# Patient Record
Sex: FEMALE | Race: WHITE | Employment: OTHER | URBAN - METROPOLITAN AREA
[De-identification: names, ages, dates, MRNs, and addresses within clinical notes are randomized per-mention and may not be internally consistent; named-entity substitution may affect disease eponyms.]

---

## 2017-01-30 ENCOUNTER — OFFICE VISIT (OUTPATIENT)
Dept: INTERNAL MEDICINE CLINIC | Age: 57
End: 2017-01-30

## 2017-01-30 VITALS
BODY MASS INDEX: 35.3 KG/M2 | RESPIRATION RATE: 17 BRPM | HEIGHT: 61 IN | HEART RATE: 68 BPM | DIASTOLIC BLOOD PRESSURE: 78 MMHG | SYSTOLIC BLOOD PRESSURE: 121 MMHG | WEIGHT: 187 LBS | OXYGEN SATURATION: 95 %

## 2017-01-30 DIAGNOSIS — Z00.00 PREVENTATIVE HEALTH CARE: ICD-10-CM

## 2017-01-30 DIAGNOSIS — E28.2 PCOS (POLYCYSTIC OVARIAN SYNDROME): ICD-10-CM

## 2017-01-30 DIAGNOSIS — I10 ESSENTIAL HYPERTENSION: ICD-10-CM

## 2017-01-30 DIAGNOSIS — E66.09 NON MORBID OBESITY DUE TO EXCESS CALORIES: ICD-10-CM

## 2017-01-30 DIAGNOSIS — E03.4 HYPOTHYROIDISM DUE TO ACQUIRED ATROPHY OF THYROID: Primary | ICD-10-CM

## 2017-01-30 LAB — HEPATITIS C ANTIBODY INTERPRETATION: NORMAL

## 2017-01-30 PROCEDURE — 99213 OFFICE O/P EST LOW 20 MIN: CPT | Performed by: INTERNAL MEDICINE

## 2017-01-30 RX ORDER — LEVOTHYROXINE SODIUM 0.05 MG/1
TABLET ORAL
Qty: 30 TABLET | Refills: 3 | Status: SHIPPED | OUTPATIENT
Start: 2017-01-30 | End: 2017-06-08 | Stop reason: SDUPTHER

## 2017-01-30 RX ORDER — SPIRONOLACTONE 100 MG/1
100 TABLET, FILM COATED ORAL DAILY
Qty: 30 TABLET | Refills: 3 | Status: SHIPPED | OUTPATIENT
Start: 2017-01-30 | End: 2017-06-08 | Stop reason: SDUPTHER

## 2017-01-30 RX ORDER — LEVOTHYROXINE SODIUM 0.07 MG/1
TABLET ORAL
Qty: 30 TABLET | Refills: 2 | Status: SHIPPED | OUTPATIENT
Start: 2017-01-30 | End: 2017-06-08 | Stop reason: SDUPTHER

## 2017-01-30 RX ORDER — PHENTERMINE HYDROCHLORIDE 37.5 MG/1
37.5 TABLET ORAL
Qty: 30 TABLET | Refills: 0 | Status: SHIPPED | OUTPATIENT
Start: 2017-01-30 | End: 2017-02-27 | Stop reason: SDUPTHER

## 2017-01-30 RX ORDER — LORATADINE 10 MG/1
10 TABLET ORAL DAILY PRN
Qty: 30 TABLET | Refills: 5 | Status: SHIPPED | OUTPATIENT
Start: 2017-01-30 | End: 2017-06-08 | Stop reason: SDUPTHER

## 2017-01-30 ASSESSMENT — PATIENT HEALTH QUESTIONNAIRE - PHQ9
2. FEELING DOWN, DEPRESSED OR HOPELESS: 0
SUM OF ALL RESPONSES TO PHQ9 QUESTIONS 1 & 2: 0
1. LITTLE INTEREST OR PLEASURE IN DOING THINGS: 0
SUM OF ALL RESPONSES TO PHQ QUESTIONS 1-9: 0

## 2017-02-27 ENCOUNTER — OFFICE VISIT (OUTPATIENT)
Dept: INTERNAL MEDICINE CLINIC | Age: 57
End: 2017-02-27

## 2017-02-27 VITALS
RESPIRATION RATE: 17 BRPM | BODY MASS INDEX: 34.58 KG/M2 | HEART RATE: 67 BPM | WEIGHT: 183 LBS | SYSTOLIC BLOOD PRESSURE: 119 MMHG | OXYGEN SATURATION: 98 % | DIASTOLIC BLOOD PRESSURE: 78 MMHG

## 2017-02-27 DIAGNOSIS — I10 ESSENTIAL HYPERTENSION: ICD-10-CM

## 2017-02-27 DIAGNOSIS — E66.09 NON MORBID OBESITY DUE TO EXCESS CALORIES: Primary | ICD-10-CM

## 2017-02-27 PROCEDURE — 99213 OFFICE O/P EST LOW 20 MIN: CPT | Performed by: INTERNAL MEDICINE

## 2017-02-27 RX ORDER — PHENTERMINE HYDROCHLORIDE 37.5 MG/1
37.5 TABLET ORAL
Qty: 30 TABLET | Refills: 0 | Status: SHIPPED | OUTPATIENT
Start: 2017-02-27 | End: 2017-03-24 | Stop reason: SDUPTHER

## 2017-02-27 ASSESSMENT — PATIENT HEALTH QUESTIONNAIRE - PHQ9
2. FEELING DOWN, DEPRESSED OR HOPELESS: 0
SUM OF ALL RESPONSES TO PHQ QUESTIONS 1-9: 0
SUM OF ALL RESPONSES TO PHQ9 QUESTIONS 1 & 2: 0
1. LITTLE INTEREST OR PLEASURE IN DOING THINGS: 0

## 2017-03-24 ENCOUNTER — OFFICE VISIT (OUTPATIENT)
Dept: INTERNAL MEDICINE CLINIC | Age: 57
End: 2017-03-24

## 2017-03-24 VITALS
SYSTOLIC BLOOD PRESSURE: 110 MMHG | DIASTOLIC BLOOD PRESSURE: 68 MMHG | BODY MASS INDEX: 34.39 KG/M2 | HEART RATE: 76 BPM | RESPIRATION RATE: 18 BRPM | WEIGHT: 182 LBS

## 2017-03-24 DIAGNOSIS — E03.4 HYPOTHYROIDISM DUE TO ACQUIRED ATROPHY OF THYROID: ICD-10-CM

## 2017-03-24 DIAGNOSIS — E66.09 NON MORBID OBESITY DUE TO EXCESS CALORIES: Primary | ICD-10-CM

## 2017-03-24 DIAGNOSIS — I10 ESSENTIAL HYPERTENSION: ICD-10-CM

## 2017-03-24 PROCEDURE — 99213 OFFICE O/P EST LOW 20 MIN: CPT | Performed by: INTERNAL MEDICINE

## 2017-03-24 RX ORDER — PHENTERMINE HYDROCHLORIDE 37.5 MG/1
37.5 TABLET ORAL
Qty: 30 TABLET | Refills: 0 | Status: SHIPPED | OUTPATIENT
Start: 2017-03-24 | End: 2017-04-23

## 2017-06-08 ENCOUNTER — OFFICE VISIT (OUTPATIENT)
Dept: INTERNAL MEDICINE CLINIC | Age: 57
End: 2017-06-08

## 2017-06-08 VITALS
DIASTOLIC BLOOD PRESSURE: 80 MMHG | WEIGHT: 186 LBS | HEIGHT: 62 IN | HEART RATE: 67 BPM | RESPIRATION RATE: 16 BRPM | OXYGEN SATURATION: 97 % | BODY MASS INDEX: 34.23 KG/M2 | SYSTOLIC BLOOD PRESSURE: 122 MMHG

## 2017-06-08 DIAGNOSIS — E03.4 HYPOTHYROIDISM DUE TO ACQUIRED ATROPHY OF THYROID: ICD-10-CM

## 2017-06-08 DIAGNOSIS — E78.2 HYPERLIPIDEMIA, MIXED: ICD-10-CM

## 2017-06-08 DIAGNOSIS — I10 ESSENTIAL HYPERTENSION: Primary | ICD-10-CM

## 2017-06-08 DIAGNOSIS — E28.2 PCOS (POLYCYSTIC OVARIAN SYNDROME): ICD-10-CM

## 2017-06-08 PROCEDURE — 99213 OFFICE O/P EST LOW 20 MIN: CPT | Performed by: INTERNAL MEDICINE

## 2017-06-08 RX ORDER — LEVOTHYROXINE SODIUM 0.05 MG/1
TABLET ORAL
Qty: 30 TABLET | Refills: 3 | Status: SHIPPED | OUTPATIENT
Start: 2017-06-08 | End: 2018-06-18

## 2017-06-08 RX ORDER — FLUTICASONE PROPIONATE 50 MCG
2 SPRAY, SUSPENSION (ML) NASAL DAILY
Qty: 1 BOTTLE | Refills: 5 | Status: SHIPPED | OUTPATIENT
Start: 2017-06-08 | End: 2018-06-18 | Stop reason: SDUPTHER

## 2017-06-08 RX ORDER — LORATADINE 10 MG/1
10 TABLET ORAL DAILY PRN
Qty: 30 TABLET | Refills: 5 | Status: SHIPPED | OUTPATIENT
Start: 2017-06-08 | End: 2017-12-08 | Stop reason: SDUPTHER

## 2017-06-08 RX ORDER — HYDRALAZINE HYDROCHLORIDE 25 MG/1
25 TABLET, FILM COATED ORAL 2 TIMES DAILY
Qty: 60 TABLET | Refills: 5 | Status: SHIPPED | OUTPATIENT
Start: 2017-06-08 | End: 2017-12-08 | Stop reason: SDUPTHER

## 2017-06-08 RX ORDER — LEVOTHYROXINE SODIUM 0.07 MG/1
TABLET ORAL
Qty: 30 TABLET | Refills: 2 | Status: SHIPPED | OUTPATIENT
Start: 2017-06-08 | End: 2017-12-08 | Stop reason: SDUPTHER

## 2017-06-08 RX ORDER — SPIRONOLACTONE 100 MG/1
100 TABLET, FILM COATED ORAL DAILY
Qty: 30 TABLET | Refills: 3 | Status: SHIPPED | OUTPATIENT
Start: 2017-06-08 | End: 2017-11-01 | Stop reason: SDUPTHER

## 2017-11-01 DIAGNOSIS — E28.2 PCOS (POLYCYSTIC OVARIAN SYNDROME): ICD-10-CM

## 2017-11-01 RX ORDER — SPIRONOLACTONE 100 MG/1
100 TABLET, FILM COATED ORAL DAILY
Qty: 30 TABLET | Refills: 0 | Status: SHIPPED | OUTPATIENT
Start: 2017-11-01 | End: 2017-12-08 | Stop reason: SDUPTHER

## 2017-11-27 LAB
A/G RATIO: 1.9 (CALC) (ref 0.8–2.6)
ALBUMIN SERPL-MCNC: 4.7 GM/DL (ref 3.5–5.2)
ALP BLD-CCNC: 120 U/L (ref 23–144)
ALT SERPL-CCNC: 29 U/L (ref 0–60)
AST SERPL-CCNC: 29 U/L (ref 0–46)
BILIRUB SERPL-MCNC: 0.4 MG/DL (ref 0–1.2)
BUN / CREAT RATIO: 20 (CALC) (ref 7–25)
BUN BLDV-MCNC: 14 MG/DL (ref 3–29)
CALCIUM SERPL-MCNC: 9.4 MG/DL (ref 8.5–10.5)
CHLORIDE BLD-SCNC: 102 MEQ/L (ref 96–110)
CHOLESTEROL, TOTAL: 223 MG/DL
CO2: 27 MEQ/L (ref 19–32)
COPY(IES) SENT TO:: NORMAL
CREAT SERPL-MCNC: 0.7 MG/DL
GFR SERPL CREATININE-BSD FRML MDRD: 95 ML/MIN/1.73M2
GLOBULIN: 2.5 GM/DL (CALC) (ref 1.9–3.6)
GLUCOSE BLD-MCNC: 99 MG/DL
HDLC SERPL-MCNC: 81 MG/DL
LDL CHOLESTEROL: 112 MG/DL (CALC)
POTASSIUM SERPL-SCNC: 4.4 MEQ/L (ref 3.4–5.3)
SODIUM BLD-SCNC: 141 MEQ/L (ref 135–148)
T4 FREE: 1.65 NG/DL (ref 0.8–1.8)
TOTAL PROTEIN: 7.2 GM/DL (ref 6–8.3)
TRIGL SERPL-MCNC: 150 MG/DL
TSH SERPL DL<=0.05 MIU/L-ACNC: 2.97 MICRO IU/ML (ref 0.4–4)
VLDLC SERPL CALC-MCNC: 30 MG/DL (CALC) (ref 4–38)

## 2017-12-08 ENCOUNTER — OFFICE VISIT (OUTPATIENT)
Dept: INTERNAL MEDICINE CLINIC | Age: 57
End: 2017-12-08

## 2017-12-08 VITALS
BODY MASS INDEX: 35.3 KG/M2 | WEIGHT: 193 LBS | HEART RATE: 78 BPM | SYSTOLIC BLOOD PRESSURE: 136 MMHG | RESPIRATION RATE: 17 BRPM | DIASTOLIC BLOOD PRESSURE: 70 MMHG | OXYGEN SATURATION: 96 %

## 2017-12-08 DIAGNOSIS — E28.2 PCOS (POLYCYSTIC OVARIAN SYNDROME): ICD-10-CM

## 2017-12-08 DIAGNOSIS — E78.2 MIXED HYPERLIPIDEMIA: ICD-10-CM

## 2017-12-08 DIAGNOSIS — E03.4 HYPOTHYROIDISM DUE TO ACQUIRED ATROPHY OF THYROID: Primary | ICD-10-CM

## 2017-12-08 DIAGNOSIS — I10 ESSENTIAL HYPERTENSION: ICD-10-CM

## 2017-12-08 PROCEDURE — 99213 OFFICE O/P EST LOW 20 MIN: CPT | Performed by: INTERNAL MEDICINE

## 2017-12-08 RX ORDER — HYDRALAZINE HYDROCHLORIDE 25 MG/1
25 TABLET, FILM COATED ORAL 2 TIMES DAILY
Qty: 60 TABLET | Refills: 5 | Status: SHIPPED | OUTPATIENT
Start: 2017-12-08 | End: 2018-06-18 | Stop reason: SDUPTHER

## 2017-12-08 RX ORDER — SPIRONOLACTONE 100 MG/1
100 TABLET, FILM COATED ORAL DAILY
Qty: 30 TABLET | Refills: 5 | Status: SHIPPED | OUTPATIENT
Start: 2017-12-08 | End: 2018-06-18 | Stop reason: SDUPTHER

## 2017-12-08 RX ORDER — LORATADINE 10 MG/1
10 TABLET ORAL DAILY PRN
Qty: 30 TABLET | Refills: 5 | Status: SHIPPED | OUTPATIENT
Start: 2017-12-08

## 2017-12-08 RX ORDER — LEVOTHYROXINE SODIUM 0.05 MG/1
TABLET ORAL
Qty: 30 TABLET | Refills: 3 | Status: CANCELLED | OUTPATIENT
Start: 2017-12-08

## 2017-12-08 RX ORDER — LEVOTHYROXINE SODIUM 0.07 MG/1
75 TABLET ORAL DAILY
Qty: 30 TABLET | Refills: 5 | Status: SHIPPED | OUTPATIENT
Start: 2017-12-08 | End: 2018-06-18 | Stop reason: SDUPTHER

## 2017-12-08 NOTE — PROGRESS NOTES
David Rios  1960 12/08/17    SUBJECTIVE:  Hypothyroid- some fatigue noted and incr hair loss. Recent TSH higher than Jan, fr 1.5--2.9. Hypertension: Stable. Denies CP, SOB, cough, visual changes, dizziness, palpitations or HA. PCOS- trying otc inositol. -- ovasitol which is a Bvit supplement. Has been losing some wt on this. OBJECTIVE:    /70   Pulse 78   Resp 17   Wt 193 lb (87.5 kg)   SpO2 96%   BMI 35.30 kg/m²     Physical Exam   Constitutional: She appears well-developed and well-nourished. Neck: Neck supple. No thyromegaly present. Cardiovascular: Normal rate, regular rhythm and normal heart sounds. Exam reveals no gallop and no friction rub. No murmur heard. Pulmonary/Chest: Effort normal and breath sounds normal. No respiratory distress. She has no wheezes. She has no rales. Abdominal: Soft. Bowel sounds are normal. She exhibits no distension. There is no tenderness. Musculoskeletal: She exhibits no edema. Lymphadenopathy:     She has no cervical adenopathy. Neurological: She is alert. Psychiatric: She has a normal mood and affect. Vitals reviewed. ASSESSMENT:    1. Hypothyroidism due to acquired atrophy of thyroid    2. Essential hypertension    3. PCOS (polycystic ovarian syndrome)        PLAN:    Velia Yeh was seen today for 6 month follow-up. Diagnoses and all orders for this visit:    Hypothyroidism due to acquired atrophy of thyroid- WILL TRY INCR SYNTHR FR ALT 50/75 TO 75MG DAILY THEN F/U TFTS 6WEEKS THEN 6MO  -     levothyroxine (SYNTHROID) 75 MCG tablet; Take 1 tablet by mouth daily  -     loratadine (CLARITIN) 10 MG tablet; Take 1 tablet by mouth daily as needed (allergies)  -     TSH without Reflex  -     T4, Free  -     TSH with Reflex  -     Cancel: T4, Free    Essential hypertension - Blood pressure stable and will continue current regimen. Will plan periodic monitoring of renal function, electrolytes, lipid profile.      -

## 2018-01-23 LAB
COPY(IES) SENT TO:: NORMAL
T4 FREE: 1.53 NG/DL (ref 0.8–1.8)
TSH SERPL DL<=0.05 MIU/L-ACNC: 1.6 MICRO IU/ML (ref 0.4–4)

## 2018-06-18 ENCOUNTER — OFFICE VISIT (OUTPATIENT)
Dept: INTERNAL MEDICINE CLINIC | Age: 58
End: 2018-06-18

## 2018-06-18 VITALS
SYSTOLIC BLOOD PRESSURE: 128 MMHG | WEIGHT: 198.6 LBS | BODY MASS INDEX: 36.55 KG/M2 | HEIGHT: 62 IN | OXYGEN SATURATION: 97 % | RESPIRATION RATE: 16 BRPM | HEART RATE: 51 BPM | DIASTOLIC BLOOD PRESSURE: 78 MMHG

## 2018-06-18 DIAGNOSIS — Z00.00 ROUTINE GENERAL MEDICAL EXAMINATION AT A HEALTH CARE FACILITY: Primary | ICD-10-CM

## 2018-06-18 DIAGNOSIS — E28.2 PCOS (POLYCYSTIC OVARIAN SYNDROME): ICD-10-CM

## 2018-06-18 DIAGNOSIS — E03.4 HYPOTHYROIDISM DUE TO ACQUIRED ATROPHY OF THYROID: ICD-10-CM

## 2018-06-18 DIAGNOSIS — I10 ESSENTIAL HYPERTENSION: ICD-10-CM

## 2018-06-18 DIAGNOSIS — E66.09 NON MORBID OBESITY DUE TO EXCESS CALORIES: ICD-10-CM

## 2018-06-18 DIAGNOSIS — E78.2 MIXED HYPERLIPIDEMIA: ICD-10-CM

## 2018-06-18 PROCEDURE — 99396 PREV VISIT EST AGE 40-64: CPT | Performed by: INTERNAL MEDICINE

## 2018-06-18 RX ORDER — LEVOTHYROXINE SODIUM 0.07 MG/1
75 TABLET ORAL DAILY
Qty: 30 TABLET | Refills: 5 | Status: SHIPPED | OUTPATIENT
Start: 2018-06-18 | End: 2018-12-26 | Stop reason: SDUPTHER

## 2018-06-18 RX ORDER — FLUTICASONE PROPIONATE 50 MCG
2 SPRAY, SUSPENSION (ML) NASAL DAILY
Qty: 1 BOTTLE | Refills: 5 | Status: SHIPPED | OUTPATIENT
Start: 2018-06-18 | End: 2018-12-26 | Stop reason: SDUPTHER

## 2018-06-18 RX ORDER — HYDRALAZINE HYDROCHLORIDE 25 MG/1
25 TABLET, FILM COATED ORAL 2 TIMES DAILY
Qty: 60 TABLET | Refills: 5 | Status: SHIPPED | OUTPATIENT
Start: 2018-06-18 | End: 2018-12-26 | Stop reason: SDUPTHER

## 2018-06-18 RX ORDER — SPIRONOLACTONE 100 MG/1
100 TABLET, FILM COATED ORAL DAILY
Qty: 30 TABLET | Refills: 5 | Status: SHIPPED | OUTPATIENT
Start: 2018-06-18 | End: 2018-12-26 | Stop reason: SDUPTHER

## 2018-06-18 ASSESSMENT — PATIENT HEALTH QUESTIONNAIRE - PHQ9
1. LITTLE INTEREST OR PLEASURE IN DOING THINGS: 0
SUM OF ALL RESPONSES TO PHQ9 QUESTIONS 1 & 2: 0
SUM OF ALL RESPONSES TO PHQ QUESTIONS 1-9: 0
2. FEELING DOWN, DEPRESSED OR HOPELESS: 0

## 2018-08-31 DIAGNOSIS — E28.2 PCOS (POLYCYSTIC OVARIAN SYNDROME): ICD-10-CM

## 2018-12-18 DIAGNOSIS — E03.4 HYPOTHYROIDISM DUE TO ACQUIRED ATROPHY OF THYROID: ICD-10-CM

## 2018-12-18 DIAGNOSIS — I10 ESSENTIAL HYPERTENSION: ICD-10-CM

## 2018-12-18 DIAGNOSIS — E78.2 MIXED HYPERLIPIDEMIA: ICD-10-CM

## 2018-12-18 LAB
ALBUMIN SERPL-MCNC: 4.6 G/DL
ALP BLD-CCNC: 111 U/L
ALT SERPL-CCNC: 30 U/L
ANION GAP SERPL CALCULATED.3IONS-SCNC: 1.9 MMOL/L
AST SERPL-CCNC: 17 U/L
BASOPHILS ABSOLUTE: 0.1 /ΜL
BASOPHILS RELATIVE PERCENT: 1.2 %
BILIRUB SERPL-MCNC: 0.3 MG/DL (ref 0.1–1.4)
BUN BLDV-MCNC: 16 MG/DL
CALCIUM SERPL-MCNC: 9.7 MG/DL
CHLORIDE BLD-SCNC: 103 MMOL/L
CHOLESTEROL, TOTAL: 201 MG/DL
CHOLESTEROL/HDL RATIO: ABNORMAL
CO2: 26 MMOL/L
CREAT SERPL-MCNC: 0.7 MG/DL
EOSINOPHILS ABSOLUTE: 0.3 /ΜL
EOSINOPHILS RELATIVE PERCENT: 3.8 %
GFR CALCULATED: 94
GLUCOSE BLD-MCNC: 102 MG/DL
HCT VFR BLD CALC: 45.1 % (ref 36–46)
HDLC SERPL-MCNC: 76 MG/DL (ref 35–70)
HEMOGLOBIN: 14.7 G/DL (ref 12–16)
LDL CHOLESTEROL CALCULATED: 106 MG/DL (ref 0–160)
LYMPHOCYTES ABSOLUTE: 2.5 /ΜL
LYMPHOCYTES RELATIVE PERCENT: 29.5 %
MCH RBC QN AUTO: 30.2 PG
MCHC RBC AUTO-ENTMCNC: 32.5 G/DL
MCV RBC AUTO: 92.7 FL
MONOCYTES ABSOLUTE: 0.6 /ΜL
MONOCYTES RELATIVE PERCENT: 7.3 %
NEUTROPHILS ABSOLUTE: 5 /ΜL
NEUTROPHILS RELATIVE PERCENT: 58.2 %
PDW BLD-RTO: 13.6 %
PLATELET # BLD: 329 K/ΜL
PMV BLD AUTO: NORMAL FL
POTASSIUM SERPL-SCNC: 4.1 MMOL/L
RBC # BLD: 4.87 10^6/ΜL
SODIUM BLD-SCNC: 143 MMOL/L
T4 FREE: 1.75
TOTAL PROTEIN: 7
TRIGL SERPL-MCNC: 93 MG/DL
TSH SERPL DL<=0.05 MIU/L-ACNC: 1.91 UIU/ML
VLDLC SERPL CALC-MCNC: 19 MG/DL
WBC # BLD: 8.6 10^3/ML

## 2018-12-26 ENCOUNTER — OFFICE VISIT (OUTPATIENT)
Dept: INTERNAL MEDICINE CLINIC | Age: 58
End: 2018-12-26
Payer: COMMERCIAL

## 2018-12-26 VITALS
BODY MASS INDEX: 37.76 KG/M2 | HEART RATE: 68 BPM | OXYGEN SATURATION: 94 % | RESPIRATION RATE: 18 BRPM | WEIGHT: 200 LBS | HEIGHT: 61 IN | SYSTOLIC BLOOD PRESSURE: 124 MMHG | DIASTOLIC BLOOD PRESSURE: 72 MMHG

## 2018-12-26 DIAGNOSIS — E03.4 HYPOTHYROIDISM DUE TO ACQUIRED ATROPHY OF THYROID: ICD-10-CM

## 2018-12-26 DIAGNOSIS — E28.2 PCOS (POLYCYSTIC OVARIAN SYNDROME): Primary | ICD-10-CM

## 2018-12-26 DIAGNOSIS — E78.2 MIXED HYPERLIPIDEMIA: ICD-10-CM

## 2018-12-26 DIAGNOSIS — I10 ESSENTIAL HYPERTENSION: ICD-10-CM

## 2018-12-26 DIAGNOSIS — J30.89 NON-SEASONAL ALLERGIC RHINITIS DUE TO OTHER ALLERGIC TRIGGER: ICD-10-CM

## 2018-12-26 PROCEDURE — 99214 OFFICE O/P EST MOD 30 MIN: CPT | Performed by: INTERNAL MEDICINE

## 2018-12-26 RX ORDER — HYDRALAZINE HYDROCHLORIDE 25 MG/1
25 TABLET, FILM COATED ORAL 2 TIMES DAILY
Qty: 180 TABLET | Refills: 1 | Status: SHIPPED | OUTPATIENT
Start: 2018-12-26 | End: 2019-06-25 | Stop reason: SDUPTHER

## 2018-12-26 RX ORDER — LEVOTHYROXINE SODIUM 0.07 MG/1
75 TABLET ORAL DAILY
Qty: 90 TABLET | Refills: 1 | Status: SHIPPED | OUTPATIENT
Start: 2018-12-26 | End: 2019-06-25 | Stop reason: SDUPTHER

## 2018-12-26 RX ORDER — SPIRONOLACTONE 100 MG/1
100 TABLET, FILM COATED ORAL DAILY
Qty: 90 TABLET | Refills: 1 | Status: SHIPPED | OUTPATIENT
Start: 2018-12-26 | End: 2019-06-25 | Stop reason: SDUPTHER

## 2018-12-26 RX ORDER — FLUTICASONE PROPIONATE 50 MCG
2 SPRAY, SUSPENSION (ML) NASAL DAILY
Qty: 3 BOTTLE | Refills: 1 | Status: SHIPPED | OUTPATIENT
Start: 2018-12-26 | End: 2019-12-23 | Stop reason: SDUPTHER

## 2019-06-25 ENCOUNTER — OFFICE VISIT (OUTPATIENT)
Dept: INTERNAL MEDICINE CLINIC | Age: 59
End: 2019-06-25
Payer: COMMERCIAL

## 2019-06-25 VITALS
DIASTOLIC BLOOD PRESSURE: 70 MMHG | HEIGHT: 61 IN | OXYGEN SATURATION: 95 % | HEART RATE: 56 BPM | SYSTOLIC BLOOD PRESSURE: 122 MMHG | BODY MASS INDEX: 36.55 KG/M2 | WEIGHT: 193.6 LBS

## 2019-06-25 DIAGNOSIS — M79.7 FIBROMYALGIA: ICD-10-CM

## 2019-06-25 DIAGNOSIS — G47.33 OSA (OBSTRUCTIVE SLEEP APNEA): ICD-10-CM

## 2019-06-25 DIAGNOSIS — I10 ESSENTIAL HYPERTENSION: ICD-10-CM

## 2019-06-25 DIAGNOSIS — E78.2 MIXED HYPERLIPIDEMIA: ICD-10-CM

## 2019-06-25 DIAGNOSIS — E28.2 PCOS (POLYCYSTIC OVARIAN SYNDROME): ICD-10-CM

## 2019-06-25 DIAGNOSIS — E03.4 HYPOTHYROIDISM DUE TO ACQUIRED ATROPHY OF THYROID: ICD-10-CM

## 2019-06-25 DIAGNOSIS — Z00.00 ROUTINE GENERAL MEDICAL EXAMINATION AT A HEALTH CARE FACILITY: Primary | ICD-10-CM

## 2019-06-25 PROCEDURE — 99396 PREV VISIT EST AGE 40-64: CPT | Performed by: INTERNAL MEDICINE

## 2019-06-25 RX ORDER — LEVOTHYROXINE SODIUM 0.07 MG/1
75 TABLET ORAL DAILY
Qty: 30 TABLET | Refills: 0 | Status: SHIPPED | OUTPATIENT
Start: 2019-06-25 | End: 2019-06-25 | Stop reason: SDUPTHER

## 2019-06-25 RX ORDER — SPIRONOLACTONE 100 MG/1
100 TABLET, FILM COATED ORAL DAILY
Qty: 90 TABLET | Refills: 3 | Status: SHIPPED | OUTPATIENT
Start: 2019-06-25 | End: 2019-12-23 | Stop reason: SDUPTHER

## 2019-06-25 RX ORDER — HYDRALAZINE HYDROCHLORIDE 25 MG/1
25 TABLET, FILM COATED ORAL 2 TIMES DAILY
Qty: 90 TABLET | Refills: 3 | Status: SHIPPED | OUTPATIENT
Start: 2019-06-25 | End: 2019-07-03 | Stop reason: SDUPTHER

## 2019-06-25 RX ORDER — SPIRONOLACTONE 100 MG/1
100 TABLET, FILM COATED ORAL DAILY
Qty: 30 TABLET | Refills: 0 | Status: SHIPPED | OUTPATIENT
Start: 2019-06-25 | End: 2019-06-25 | Stop reason: SDUPTHER

## 2019-06-25 RX ORDER — HYDRALAZINE HYDROCHLORIDE 25 MG/1
25 TABLET, FILM COATED ORAL 2 TIMES DAILY
Qty: 30 TABLET | Refills: 0 | Status: SHIPPED | OUTPATIENT
Start: 2019-06-25 | End: 2019-06-25 | Stop reason: SDUPTHER

## 2019-06-25 RX ORDER — LEVOTHYROXINE SODIUM 0.07 MG/1
75 TABLET ORAL DAILY
Qty: 90 TABLET | Refills: 3 | Status: SHIPPED | OUTPATIENT
Start: 2019-06-25 | End: 2019-12-23 | Stop reason: SDUPTHER

## 2019-06-25 ASSESSMENT — PATIENT HEALTH QUESTIONNAIRE - PHQ9
2. FEELING DOWN, DEPRESSED OR HOPELESS: 0
SUM OF ALL RESPONSES TO PHQ9 QUESTIONS 1 & 2: 0
1. LITTLE INTEREST OR PLEASURE IN DOING THINGS: 0
SUM OF ALL RESPONSES TO PHQ QUESTIONS 1-9: 0
SUM OF ALL RESPONSES TO PHQ QUESTIONS 1-9: 0

## 2019-06-25 NOTE — PROGRESS NOTES
Internal Medicine  History and Physical        Pratik Mode  YOB: 1960    Date of Service:  6/25/2019      Chief Complaint:   Pratik Joseph is a 62 y.o. 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 Chol also much better and wt is down 7#. Hypertension: Stable. Denies CP, SOB, cough, visual changes, dizziness, palpitations or HA. Hypothyroid has been asymptomatic w/o sx of fatigue, constipation, cold intolerance, depression. Fibromyalgia- also aches better w care home, some L knee ache w strain moving gravel off [place] two mo ago, grad better. PCOS, on metformin. rf due. HARSHA- on cpap, seeing Dr Jayesh Vale periodically.       Patient Active Problem List   Diagnosis    Fibromyalgia    Hypertension    PCOS (polycystic ovarian syndrome)    Family history of colon cancer    Allergic rhinitis    Former smoker    Fatigue    Family history of coronary artery disease    SOBOE (shortness of breath on exertion)    Multiple thyroid nodules    H/O cardiovascular stress test    HARSHA (obstructive sleep apnea)    Mixed hyperlipidemia    GERD (gastroesophageal reflux disease)    Hypothyroidism due to acquired atrophy of thyroid    Non morbid obesity due to excess calories       Preventive Care:  Health Maintenance   Topic Date Due    HIV screen  05/19/1973    DTaP/Tdap/Td vaccine (1 - Tdap) 05/19/1977    Shingles Vaccine (1 of 2) 05/19/2008    Cervical cancer screen  04/11/2015    Breast cancer screen  10/20/2019    Potassium monitoring  06/21/2020    Creatinine monitoring  06/21/2020    Lipid screen  06/21/2024    Colon cancer screen colonoscopy  10/22/2024    Flu vaccine  Completed    Hepatitis C screen  Completed    Pneumococcal 0-64 years Vaccine  Aged Out        Lipid panel:   Lab Results   Component Value Date    TRIG 88 06/20/2019    HDL 59 06/20/2019    181Roby Maza 106 12/17/2018        Immunization History   Administered Date(s) Administered    Influenza Virus Vaccine 09/08/2014, 10/26/2018    Influenza, Intradermal, Preservative free 12/07/2015    Influenza, Tanvi Lanamer, 3 Years and older, IM (Fluzone 3 yrs and older or Afluria 5 yrs and older) 10/31/2016       Allergies   Allergen Reactions    Norvasc [Amlodipine]      Leg swelling     Current Outpatient Medications   Medication Sig Dispense Refill    spironolactone (ALDACTONE) 100 MG tablet Take 1 tablet by mouth daily 90 tablet 1    levothyroxine (SYNTHROID) 75 MCG tablet Take 1 tablet by mouth daily 90 tablet 1    hydrALAZINE (APRESOLINE) 25 MG tablet Take 1 tablet by mouth 2 times daily 180 tablet 1    fluticasone (FLONASE) 50 MCG/ACT nasal spray 2 sprays by Nasal route daily 3 Bottle 1    metFORMIN (GLUCOPHAGE) 500 MG tablet Take 2 tablets by mouth 2 times daily (with meals) 360 tablet 1    loratadine (CLARITIN) 10 MG tablet Take 1 tablet by mouth daily as needed (allergies) 30 tablet 5    aspirin 81 MG tablet Take 81 mg by mouth daily.  Nutritional Supplements (JUICE PLUS FIBRE PO) Take  by mouth 2 times daily.  calcium carbonate (OSCAL) 500 MG TABS tablet Take 500 mg by mouth daily.  ibuprofen (ADVIL) 200 MG CAPS Take 1 capsule by mouth. 2 tablets nightly      Inositol-D Chiro-Inositol (OVASITOL PO) Take 4 g by mouth       No current facility-administered medications for this visit. Past Medical History:   Diagnosis Date    Allergic rhinitis     Basal cell carcinoma 2011; 2014    right side face; left shoulder blade.      Family history of colon cancer     mother dx'd at 46    Family history of coronary artery disease     Fatigue     Fibromyalgia     on zoloft, also for mood    Former smoker     GERD (gastroesophageal reflux disease)     H/O cardiovascular stress test 3/12/2014    treadmill    H/O cardiovascular stress test 3/12/2015    treadmill    Hypertension     Hypothyroidism due to acquired atrophy of thyroid     Multiple thyroid nodules 2015    bx benign, eval 2016 per ENT Dr Amanda Gonzales and for f/u 2 yrs- recheck u/s    HARSHA (obstructive sleep apnea)     Dr Angelito Martin cpap    PCOS (polycystic ovarian syndrome)     on B vit supplement otc    S/P colonoscopy 10/22/14    Dr Susy Chand- recheck 5yrs d/t fhx    SOBOE (shortness of breath on exertion)      Past Surgical History:   Procedure Laterality Date    HYSTERECTOMY      Total    TONSILLECTOMY  1968     Family History   Problem Relation Age of Onset    Kidney Disease Mother         Kidney failure    Heart Failure Mother         CHF    Cancer Mother         colon CA at 46    High Blood Pressure Mother    Nohemi Amble Stroke Mother     Obesity Mother     Depression Mother     Diabetes Father     High Blood Pressure Father     Stroke Father     Drug Abuse Father     Alcohol Abuse Father     Alcohol Abuse Sister     Drug Abuse Sister     Cirrhosis Sister     Other Sister         Hep C    Obesity Sister     Drug Abuse Brother     Alcohol Abuse Brother      Social History     Socioeconomic History    Marital status:      Spouse name: Not on file    Number of children: Not on file    Years of education: Not on file    Highest education level: Not on file   Occupational History    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[place] [address] 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   Social Needs    Financial resource strain: Not on file    Food insecurity:     Worry: Not on file     Inability: Not on file   [employer] needs:     Medical: Not on file     Non-medical: Not on file   Tobacco Use    Smoking status: Former Smoker     Packs/day: 1.00     Years: 25.00     Pack years: 25.00     Last attempt to quit:      Years since quittin.4    Smokeless tobacco: Never Used   Substance and Sexual Activity    Alcohol use: Yes     Comment: Very rare    Drug use: Not on file    Sexual activity: Not on file   Lifestyle    Physical activity:     Days per week: Not on file     Minutes per session: Not on file    Stress: Not on file   Relationships    Social connections:     Talks on phone: Not on file     Gets together: Not on file     Attends Yazidi service: Not on file     Active member of club or organization: Not on file     Attends meetings of clubs or organizations: Not on file     Relationship status: Not on file    Intimate partner violence:     Fear of current or ex partner: Not on file     Emotionally abused: Not on file     Physically abused: Not on file     Forced sexual activity: Not on file   Other Topics Concern    Not on file   Social History Narrative    Not on file       Review of Systems:  A comprehensive review of systems was negative except for what was noted in the HPI. Wt Readings from Last 3 Encounters:   06/25/19 193 lb 9.6 oz (87.8 kg)   12/26/18 200 lb (90.7 kg)   06/18/18 198 lb 9.6 oz (90.1 kg)     BP Readings from Last 3 Encounters:   06/25/19 122/70   12/26/18 124/72   06/18/18 128/78       Physical Exam:   Vitals:    06/25/19 1006   BP: 122/70   Site: Left Upper Arm   Position: Sitting   Cuff Size: Medium Adult   Pulse: 56   SpO2: 95%   Weight: 193 lb 9.6 oz (87.8 kg)   Height: 5' 1\" (1.549 m)     Body mass index is 36.58 kg/m². Constitutional: She is oriented to person, place, and time. She appears well-developed and well-nourished. No distress. HEENT:  Head: Normocephalic and atraumatic. Nose: Nose normal.   Mouth/Throat: Oropharynx is clear and moist, and mucous membranes are normal.  There is no cervical adenopathy. Eyes: Conjunctivae and extraocular motions are normal. Pupils are equal, round, and reactive to light. Neck: Neck supple. No JVD present. No mass and no thyromegaly present. Cardiovascular: Normal rate, regular rhythm, normal heart sounds and intact distal pulses.   Exam reveals no gallop and no friction rub. No murmur heard. Pulmonary/Chest: Effort normal and breath sounds normal. No respiratory distress. She has no wheezes, rhonchi or rales. Abdominal: Soft, non-tender. Bowel sounds and aorta are normal. She exhibits no organomegaly, mass or bruit. Musculoskeletal: Normal range of motion, no synovitis. She exhibits no edema. Neurological: She is alert and oriented to person, place, and time. No cranial nerve deficit. Skin: Skin is warm and dry. There is no rash or erythema. Psychiatric: She has a normal mood and affect. Her speech is normal and behavior is normal. Judgment, cognition and memory are normal.         Assessment/Plan:  Damian Tee was seen today for annual exam and knee pain. Diagnoses and all orders for this visit:    Routine general medical examination at a health care facility- [address] [place] [address], WT DOWN AND BP STABLE, SEE BELOW    Essential hypertension- Blood pressure stable and will continue current regimen. Will plan periodic monitoring of renal function, electrolytes, lipid profile. -     Discontinue: hydrALAZINE (APRESOLINE) 25 MG tablet; Take 1 tablet by mouth 2 times daily  -     Lipid Panel; Future  -     Comprehensive Metabolic Panel; Future  -     CBC Auto Differential; Future  -     hydrALAZINE (APRESOLINE) 25 MG tablet; Take 1 tablet by mouth 2 times daily    PCOS (polycystic ovarian syndrome)- The current medical regimen is effective;  continue present plan and medications. -     Discontinue: metFORMIN (GLUCOPHAGE) 500 MG tablet; Take 2 tablets by mouth 2 times daily (with meals)  -     Discontinue: spironolactone (ALDACTONE) 100 MG tablet; Take 1 tablet by mouth daily  -     metFORMIN (GLUCOPHAGE) 500 MG tablet; Take 2 tablets by mouth 2 times daily (with meals)  -     spironolactone (ALDACTONE) 100 MG tablet;  Take 1 tablet by mouth daily    Hypothyroidism due to acquired atrophy of thyroid - Clinically hypothyroidism

## 2019-07-03 DIAGNOSIS — E28.2 PCOS (POLYCYSTIC OVARIAN SYNDROME): ICD-10-CM

## 2019-07-03 DIAGNOSIS — I10 ESSENTIAL HYPERTENSION: ICD-10-CM

## 2019-07-03 RX ORDER — HYDRALAZINE HYDROCHLORIDE 25 MG/1
25 TABLET, FILM COATED ORAL 2 TIMES DAILY
Qty: 180 TABLET | Refills: 3 | Status: SHIPPED | OUTPATIENT
Start: 2019-07-03 | End: 2019-12-23 | Stop reason: SDUPTHER

## 2019-12-23 ENCOUNTER — OFFICE VISIT (OUTPATIENT)
Dept: INTERNAL MEDICINE CLINIC | Age: 59
End: 2019-12-23
Payer: COMMERCIAL

## 2019-12-23 VITALS
OXYGEN SATURATION: 97 % | WEIGHT: 189 LBS | BODY MASS INDEX: 35.71 KG/M2 | HEART RATE: 73 BPM | SYSTOLIC BLOOD PRESSURE: 130 MMHG | DIASTOLIC BLOOD PRESSURE: 70 MMHG | RESPIRATION RATE: 16 BRPM

## 2019-12-23 DIAGNOSIS — Z12.31 VISIT FOR SCREENING MAMMOGRAM: ICD-10-CM

## 2019-12-23 DIAGNOSIS — F41.9 ANXIETY: ICD-10-CM

## 2019-12-23 DIAGNOSIS — E78.2 MIXED HYPERLIPIDEMIA: ICD-10-CM

## 2019-12-23 DIAGNOSIS — J30.89 NON-SEASONAL ALLERGIC RHINITIS DUE TO OTHER ALLERGIC TRIGGER: ICD-10-CM

## 2019-12-23 DIAGNOSIS — Z90.710 S/P TOTAL HYSTERECTOMY AND BILATERAL SALPINGO-OOPHORECTOMY: ICD-10-CM

## 2019-12-23 DIAGNOSIS — Z80.0 FAMILY HISTORY OF COLON CANCER: ICD-10-CM

## 2019-12-23 DIAGNOSIS — I10 ESSENTIAL HYPERTENSION: Primary | ICD-10-CM

## 2019-12-23 DIAGNOSIS — E28.2 PCOS (POLYCYSTIC OVARIAN SYNDROME): ICD-10-CM

## 2019-12-23 DIAGNOSIS — E03.4 HYPOTHYROIDISM DUE TO ACQUIRED ATROPHY OF THYROID: ICD-10-CM

## 2019-12-23 DIAGNOSIS — Z90.722 S/P TOTAL HYSTERECTOMY AND BILATERAL SALPINGO-OOPHORECTOMY: ICD-10-CM

## 2019-12-23 DIAGNOSIS — Z90.79 S/P TOTAL HYSTERECTOMY AND BILATERAL SALPINGO-OOPHORECTOMY: ICD-10-CM

## 2019-12-23 PROCEDURE — 99214 OFFICE O/P EST MOD 30 MIN: CPT | Performed by: INTERNAL MEDICINE

## 2019-12-23 RX ORDER — BUSPIRONE HYDROCHLORIDE 10 MG/1
10 TABLET ORAL 2 TIMES DAILY PRN
Qty: 90 TABLET | Refills: 0 | Status: SHIPPED | OUTPATIENT
Start: 2019-12-23 | End: 2020-03-22

## 2019-12-23 RX ORDER — FLUTICASONE PROPIONATE 50 MCG
2 SPRAY, SUSPENSION (ML) NASAL DAILY
Qty: 3 BOTTLE | Refills: 1 | Status: SHIPPED | OUTPATIENT
Start: 2019-12-23 | End: 2020-06-23 | Stop reason: SDUPTHER

## 2019-12-23 RX ORDER — LEVOTHYROXINE SODIUM 0.07 MG/1
75 TABLET ORAL DAILY
Qty: 90 TABLET | Refills: 1 | Status: SHIPPED | OUTPATIENT
Start: 2019-12-23 | End: 2020-06-23 | Stop reason: SDUPTHER

## 2019-12-23 RX ORDER — HYDRALAZINE HYDROCHLORIDE 25 MG/1
25 TABLET, FILM COATED ORAL 2 TIMES DAILY
Qty: 180 TABLET | Refills: 1 | Status: SHIPPED | OUTPATIENT
Start: 2019-12-23 | End: 2020-06-23 | Stop reason: SDUPTHER

## 2019-12-23 RX ORDER — SPIRONOLACTONE 100 MG/1
100 TABLET, FILM COATED ORAL DAILY
Qty: 90 TABLET | Refills: 1 | Status: SHIPPED | OUTPATIENT
Start: 2019-12-23 | End: 2020-06-23 | Stop reason: SDUPTHER

## 2020-01-27 ENCOUNTER — ANESTHESIA EVENT (OUTPATIENT)
Dept: ENDOSCOPY | Age: 60
End: 2020-01-27
Payer: COMMERCIAL

## 2020-01-27 ASSESSMENT — ENCOUNTER SYMPTOMS: SHORTNESS OF BREATH: 1

## 2020-01-27 NOTE — ANESTHESIA PRE PROCEDURE
Department of Anesthesiology  Preprocedure Note       Name:  Haile Ferreira   Age:  62 y.o.  :  1960                                          MRN:  [id number]         Date:  2020      Surgeon: Neelam Briggs):  Guillermina Holden MD    Procedure: COLORECTAL CANCER SCREENING, NOT HIGH RISK (N/A )    Medications prior to admission:   Prior to Admission medications    Medication Sig Start Date End Date Taking? Authorizing Provider   spironolactone (ALDACTONE) 100 MG tablet Take 1 tablet by mouth daily 19   Lisa Zee MD   levothyroxine (SYNTHROID) 75 MCG tablet Take 1 tablet by mouth daily 19   Lisa Zee MD   hydrALAZINE (APRESOLINE) 25 MG tablet Take 1 tablet by mouth 2 times daily 19   Lisa Zee MD   metFORMIN (GLUCOPHAGE) 500 MG tablet Take 2 tablets by mouth 2 times daily (with meals) 12/23/19 3/22/20  Lisa Zee MD   fluticasone [facility]) 50 MCG/ACT nasal spray 2 sprays by Nasal route daily 19   Lisa Zee MD   Estriol-Estradiol (BI-EST 50:50) CREA Place onto the skin daily    Historical Provider, MD   busPIRone (BUSPAR) 10 MG tablet Take 1 tablet by mouth 2 times daily as needed (anxiety) 12/23/19 3/22/20  Lisa Zee MD   loratadine (CLARITIN) 10 MG tablet Take 1 tablet by mouth daily as needed (allergies) 17   Lisa Zee MD   aspirin 81 MG tablet Take 81 mg by mouth daily. Historical Provider, MD   calcium carbonate (OSCAL) 500 MG TABS tablet Take 500 mg by mouth daily. Historical Provider, MD   ibuprofen (ADVIL) 200 MG CAPS Take 1 capsule by mouth. 2 tablets nightly    Historical Provider, MD       Current medications:    No current facility-administered medications for this encounter.       Current Outpatient Medications   Medication Sig Dispense Refill    spironolactone (ALDACTONE) 100 MG tablet Take 1 tablet by mouth daily 90 tablet 1    levothyroxine (SYNTHROID) 75 MCG tablet Take 1 tablet by mouth

## 2020-01-28 ENCOUNTER — HOSPITAL ENCOUNTER (OUTPATIENT)
Age: 60
Setting detail: OUTPATIENT SURGERY
Discharge: HOME | End: 2020-01-28
Attending: SPECIALIST | Admitting: SPECIALIST
Payer: COMMERCIAL

## 2020-01-28 ENCOUNTER — ANESTHESIA (OUTPATIENT)
Dept: ENDOSCOPY | Age: 60
End: 2020-01-28
Payer: COMMERCIAL

## 2020-01-28 VITALS — OXYGEN SATURATION: 100 % | DIASTOLIC BLOOD PRESSURE: 62 MMHG | SYSTOLIC BLOOD PRESSURE: 128 MMHG

## 2020-01-28 VITALS
HEART RATE: 71 BPM | OXYGEN SATURATION: 97 % | DIASTOLIC BLOOD PRESSURE: 71 MMHG | SYSTOLIC BLOOD PRESSURE: 129 MMHG | RESPIRATION RATE: 16 BRPM | TEMPERATURE: 97.6 F | HEIGHT: 62 IN | WEIGHT: 189 LBS | BODY MASS INDEX: 34.78 KG/M2

## 2020-01-28 PROCEDURE — 3700000000 HC ANESTHESIA ATTENDED CARE: Performed by: SPECIALIST

## 2020-01-28 PROCEDURE — 3609027000 HC COLONOSCOPY: Performed by: SPECIALIST

## 2020-01-28 PROCEDURE — 7100000011 HC PHASE II RECOVERY - ADDTL 15 MIN: Performed by: SPECIALIST

## 2020-01-28 PROCEDURE — 7100000010 HC PHASE II RECOVERY - FIRST 15 MIN: Performed by: SPECIALIST

## 2020-01-28 PROCEDURE — 3700000001 HC ADD 15 MINUTES (ANESTHESIA): Performed by: SPECIALIST

## 2020-01-28 PROCEDURE — 2709999900 HC NON-CHARGEABLE SUPPLY: Performed by: SPECIALIST

## 2020-01-28 PROCEDURE — 2500000003 HC RX 250 WO HCPCS: Performed by: NURSE ANESTHETIST, CERTIFIED REGISTERED

## 2020-01-28 PROCEDURE — 6360000002 HC RX W HCPCS: Performed by: NURSE ANESTHETIST, CERTIFIED REGISTERED

## 2020-01-28 RX ORDER — LIDOCAINE HYDROCHLORIDE 20 MG/ML
INJECTION, SOLUTION EPIDURAL; INFILTRATION; INTRACAUDAL; PERINEURAL PRN
Status: DISCONTINUED | OUTPATIENT
Start: 2020-01-28 | End: 2020-01-28 | Stop reason: SDUPTHER

## 2020-01-28 RX ORDER — SODIUM CHLORIDE, SODIUM LACTATE, POTASSIUM CHLORIDE, CALCIUM CHLORIDE 600; 310; 30; 20 MG/100ML; MG/100ML; MG/100ML; MG/100ML
INJECTION, SOLUTION INTRAVENOUS CONTINUOUS
Status: DISCONTINUED | OUTPATIENT
Start: 2020-01-28 | End: 2020-01-28 | Stop reason: HOSPADM

## 2020-01-28 RX ORDER — PROPOFOL 10 MG/ML
INJECTION, EMULSION INTRAVENOUS PRN
Status: DISCONTINUED | OUTPATIENT
Start: 2020-01-28 | End: 2020-01-28 | Stop reason: SDUPTHER

## 2020-01-28 RX ORDER — SODIUM CHLORIDE, SODIUM LACTATE, POTASSIUM CHLORIDE, CALCIUM CHLORIDE 600; 310; 30; 20 MG/100ML; MG/100ML; MG/100ML; MG/100ML
INJECTION, SOLUTION INTRAVENOUS
Status: DISCONTINUED
Start: 2020-01-28 | End: 2020-01-28 | Stop reason: HOSPADM

## 2020-01-28 RX ORDER — EPHEDRINE SULFATE 50 MG/ML
INJECTION INTRAVENOUS PRN
Status: DISCONTINUED | OUTPATIENT
Start: 2020-01-28 | End: 2020-01-28 | Stop reason: SDUPTHER

## 2020-01-28 RX ADMIN — LIDOCAINE HYDROCHLORIDE 100 MG: 20 INJECTION, SOLUTION EPIDURAL; INFILTRATION; INTRACAUDAL; PERINEURAL at 10:07

## 2020-01-28 RX ADMIN — EPHEDRINE SULFATE 10 MG: 50 INJECTION, SOLUTION INTRAVENOUS at 10:15

## 2020-01-28 RX ADMIN — PROPOFOL 400 MG: 10 INJECTION, EMULSION INTRAVENOUS at 10:07

## 2020-01-28 ASSESSMENT — PAIN SCALES - GENERAL
PAINLEVEL_OUTOF10: 0
PAINLEVEL_OUTOF10: 0

## 2020-01-28 NOTE — PROGRESS NOTES
Pre procedure checklist questions verified with pt and  at bedside including procedure, npo status, bowel prep and allergies. Pt states no BB or blood thinners. No questions voiced and support given.

## 2020-01-28 NOTE — ANESTHESIA POSTPROCEDURE EVALUATION
Department of Anesthesiology  Postprocedure Note    Patient: Walt Segovia  MRN: [id number]  [place]: 1960  Date of evaluation: 1/28/2020  Time:  10:39 AM     Procedure Summary     Date:  01/28/20 Room / Location:  [address] [place] [address]    Anesthesia Start:  1005 Anesthesia Stop:  1039    Procedure:  COLONOSCOPY DIAGNOSTIC (N/A ) Diagnosis:  (F/H COLON CA)    Surgeon:  Ernie Meckel, MD Responsible Provider:  Margot De La Paz MD    Anesthesia Type:  MAC ASA Status:  3          Anesthesia Type: MAC    Sandro Phase I:      Sandro Phase II:      Last vitals: Reviewed and per EMR flowsheets.        Anesthesia Post Evaluation    Patient location during evaluation: bedside  Patient participation: complete - patient participated  Level of consciousness: awake and alert  Pain score: 0  Airway patency: patent  Nausea & Vomiting: no nausea and no vomiting  Complications: no  Cardiovascular status: blood pressure returned to baseline  Respiratory status: acceptable, nonlabored ventilation, spontaneous ventilation and room air  Hydration status: euvolemic

## 2020-06-17 DIAGNOSIS — I10 ESSENTIAL HYPERTENSION: ICD-10-CM

## 2020-06-17 DIAGNOSIS — E78.2 MIXED HYPERLIPIDEMIA: ICD-10-CM

## 2020-06-17 DIAGNOSIS — E03.4 HYPOTHYROIDISM DUE TO ACQUIRED ATROPHY OF THYROID: ICD-10-CM

## 2020-06-17 LAB
ALBUMIN SERPL-MCNC: 4.2 G/DL
ALP BLD-CCNC: 89 U/L
ALT SERPL-CCNC: 24 U/L
ANION GAP SERPL CALCULATED.3IONS-SCNC: 2.2 MMOL/L
AST SERPL-CCNC: 16 U/L
BASOPHILS ABSOLUTE: 0.9 /ΜL
BASOPHILS RELATIVE PERCENT: 0.1 %
BILIRUB SERPL-MCNC: 0.2 MG/DL (ref 0.1–1.4)
BUN BLDV-MCNC: 14 MG/DL
CALCIUM SERPL-MCNC: 9.2 MG/DL
CHLORIDE BLD-SCNC: 104 MMOL/L
CHOLESTEROL, TOTAL: 188 MG/DL
CHOLESTEROL/HDL RATIO: NORMAL
CO2: 29 MMOL/L
CREAT SERPL-MCNC: 0.7 MG/DL
EOSINOPHILS ABSOLUTE: 4.8 /ΜL
EOSINOPHILS RELATIVE PERCENT: 0.4 %
GFR CALCULATED: 93
GLUCOSE BLD-MCNC: 96 MG/DL
HCT VFR BLD CALC: 39.5 % (ref 36–46)
HDLC SERPL-MCNC: 62 MG/DL (ref 35–70)
HEMOGLOBIN: 13.1 G/DL (ref 12–16)
LDL CHOLESTEROL CALCULATED: 101 MG/DL (ref 0–160)
LYMPHOCYTES ABSOLUTE: 39.6 /ΜL
LYMPHOCYTES RELATIVE PERCENT: 3.3 %
MCH RBC QN AUTO: 30.5 PG
MCHC RBC AUTO-ENTMCNC: 33.2 G/DL
MCV RBC AUTO: 92 FL
MONOCYTES ABSOLUTE: 6.9 /ΜL
MONOCYTES RELATIVE PERCENT: 0.6 %
NEUTROPHILS ABSOLUTE: 47.8 /ΜL
NEUTROPHILS RELATIVE PERCENT: 4 %
PDW BLD-RTO: 13.8 %
PLATELET # BLD: 334 K/ΜL
PMV BLD AUTO: NORMAL FL
POTASSIUM SERPL-SCNC: 4.7 MMOL/L
RBC # BLD: 4.3 10^6/ΜL
SODIUM BLD-SCNC: 144 MMOL/L
T4 FREE: 1.53
TOTAL PROTEIN: 6.1
TRIGL SERPL-MCNC: 125 MG/DL
TSH SERPL DL<=0.05 MIU/L-ACNC: 2.88 UIU/ML
VLDLC SERPL CALC-MCNC: 25 MG/DL
WBC # BLD: 8.4 10^3/ML

## 2020-06-23 ENCOUNTER — OFFICE VISIT (OUTPATIENT)
Dept: INTERNAL MEDICINE CLINIC | Age: 60
End: 2020-06-23
Payer: COMMERCIAL

## 2020-06-23 VITALS
BODY MASS INDEX: 34.2 KG/M2 | HEART RATE: 69 BPM | RESPIRATION RATE: 16 BRPM | DIASTOLIC BLOOD PRESSURE: 66 MMHG | SYSTOLIC BLOOD PRESSURE: 112 MMHG | WEIGHT: 187 LBS

## 2020-06-23 PROCEDURE — 99396 PREV VISIT EST AGE 40-64: CPT | Performed by: INTERNAL MEDICINE

## 2020-06-23 RX ORDER — LEVOTHYROXINE SODIUM 88 UG/1
88 TABLET ORAL DAILY
Qty: 90 TABLET | Refills: 1 | Status: SHIPPED | OUTPATIENT
Start: 2020-06-23 | End: 2021-01-02 | Stop reason: SDUPTHER

## 2020-06-23 RX ORDER — PREDNISONE 1 MG/1
TABLET ORAL
Qty: 21 TABLET | Refills: 0 | Status: SHIPPED | OUTPATIENT
Start: 2020-06-23 | End: 2020-07-28

## 2020-06-23 RX ORDER — ALBUTEROL SULFATE 90 UG/1
2 AEROSOL, METERED RESPIRATORY (INHALATION) EVERY 6 HOURS PRN
Qty: 1 INHALER | Refills: 3 | Status: SHIPPED | OUTPATIENT
Start: 2020-06-23 | End: 2022-07-11 | Stop reason: SDUPTHER

## 2020-06-23 RX ORDER — LEVOTHYROXINE SODIUM 0.07 MG/1
75 TABLET ORAL DAILY
Qty: 90 TABLET | Refills: 1 | Status: SHIPPED | OUTPATIENT
Start: 2020-06-23 | End: 2020-06-23

## 2020-06-23 RX ORDER — FLUTICASONE PROPIONATE 50 MCG
2 SPRAY, SUSPENSION (ML) NASAL DAILY
Qty: 3 BOTTLE | Refills: 1 | Status: SHIPPED | OUTPATIENT
Start: 2020-06-23 | End: 2021-01-28 | Stop reason: SDUPTHER

## 2020-06-23 RX ORDER — HYDRALAZINE HYDROCHLORIDE 25 MG/1
25 TABLET, FILM COATED ORAL 2 TIMES DAILY
Qty: 180 TABLET | Refills: 1 | Status: SHIPPED | OUTPATIENT
Start: 2020-06-23 | End: 2021-01-02 | Stop reason: SDUPTHER

## 2020-06-23 RX ORDER — MONTELUKAST SODIUM 10 MG/1
10 TABLET ORAL DAILY
Qty: 90 TABLET | Refills: 1 | Status: SHIPPED | OUTPATIENT
Start: 2020-06-23 | End: 2021-01-02 | Stop reason: SDUPTHER

## 2020-06-23 RX ORDER — SPIRONOLACTONE 100 MG/1
100 TABLET, FILM COATED ORAL DAILY
Qty: 90 TABLET | Refills: 1 | Status: SHIPPED | OUTPATIENT
Start: 2020-06-23 | End: 2020-10-27 | Stop reason: SDUPTHER

## 2020-06-23 ASSESSMENT — PATIENT HEALTH QUESTIONNAIRE - PHQ9
SUM OF ALL RESPONSES TO PHQ QUESTIONS 1-9: 0
SUM OF ALL RESPONSES TO PHQ QUESTIONS 1-9: 0
SUM OF ALL RESPONSES TO PHQ9 QUESTIONS 1 & 2: 0
2. FEELING DOWN, DEPRESSED OR HOPELESS: 0
1. LITTLE INTEREST OR PLEASURE IN DOING THINGS: 0

## 2020-06-23 NOTE — PROGRESS NOTES
Vaccine 09/08/2014, 10/26/2018    Influenza, Intradermal, Preservative free 12/07/2015    Influenza, Quadv, IM, (6 mo and older Fluzone, Flulaval, Fluarix and 3 yrs and older Afluria) 10/31/2016    Influenza, Kirsty Alexander, IM, PF (6 mo and older Fluzone, Flulaval, Fluarix, and 3 yrs and older Afluria) 10/25/2017, 11/20/2019    MMR 05/11/1994    Meningococcal MCV4P (Menactra) 10/29/2008    Polio IPV (IPOL) 10/29/2008    Td vaccine (adult) 03/10/2005    Tdap (Boostrix, Adacel) 10/29/2008    Typhoid Vi capsular polysaccharide (Typhim VI) 10/29/2008    Yellow Fever (YF-Vax) 10/29/2008       Patient Active Problem List   Diagnosis    Fibromyalgia    Hypertension    PCOS (polycystic ovarian syndrome)    Family history of colon cancer    Allergic rhinitis    Former smoker    Fatigue    Family history of coronary artery disease    SOBOE (shortness of breath on exertion)    Multiple thyroid nodules    H/O cardiovascular stress test    HARSHA (obstructive sleep apnea)    Mixed hyperlipidemia    GERD (gastroesophageal reflux disease)    Hypothyroidism due to acquired atrophy of thyroid    Non morbid obesity due to excess calories    S/P total hysterectomy and bilateral salpingo-oophorectomy       Past Medical History:   Diagnosis Date    Allergic rhinitis     Basal cell carcinoma 2011; 2014    right side face; left shoulder blade.      Family history of colon cancer     mother dx'd at 46    Family history of coronary artery disease     Fatigue     Fibromyalgia     on zoloft, also for mood    Former smoker     GERD (gastroesophageal reflux disease)     H/O cardiovascular stress test 3/12/2014    treadmill    H/O cardiovascular stress test 3/12/2015    treadmill    Hypertension     Hypothyroidism due to acquired atrophy of thyroid     Multiple thyroid nodules 03/2015    bx benign, eval 12/2016 per ENT Dr Júnior Johansen and for f/u 2 yrs- recheck u/s    HARSHA (obstructive sleep apnea)     Dr Omar Thao [facility] PCOS (polycystic ovarian syndrome)     on B vit supplement otc    S/P colonoscopy 10/22/14    Dr Keny deng 5yrs d/t fhx    S/P total hysterectomy and bilateral salpingo-oophorectomy     AT 32 YO    SOBOE (shortness of breath on exertion)      Past Surgical History:   Procedure Laterality Date    COLONOSCOPY      COLONOSCOPY  2020    int hem, otherwise normal repeat in 5 years    COLONOSCOPY N/A 2020    COLONOSCOPY DIAGNOSTIC performed by Vaibhav Lozano MD at [facility]    Total   [address]     Family History   Problem Relation Age of Onset    Diabetes Father     High Blood Pressure Father     Stroke Father     Drug Abuse Father     Alcohol Abuse Father     Alcohol Abuse Sister     Drug Abuse Sister     Cirrhosis Sister     Other Sister         Hep C    Obesity Sister     Kidney Disease Mother         Kidney failure    Heart Failure Mother         CHF    Cancer Mother         colon CA at 46    High Blood Pressure Mother     Stroke Mother     Obesity Mother     Depression Mother     Drug Abuse Brother     Alcohol Abuse Brother      Social History     Socioeconomic History    Marital status:      Spouse name: Not on file    Number of children: Not on file    Years of education: Not on file    Highest education level: Not on file   Occupational History    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 Lymphadenopathy:      Cervical: No cervical adenopathy. Skin:     General: Skin is warm and dry. Neurological:      General: No focal deficit present. Mental Status: She is alert. Psychiatric:         Mood and Affect: Mood normal.         ASSESSMENT:    1. Routine general medical examination at a health care facility    2. Eosinophilia    3. Wheezing    4. PCOS (polycystic ovarian syndrome)    5. Hypothyroidism due to acquired atrophy of thyroid    6. Essential hypertension    7. Non-seasonal allergic rhinitis due to other allergic trigger        PLAN:    Jaclyn Youssef was seen today for follow-up. Diagnoses and all orders for this visit:    Routine general medical examination at a health care facility- SEE ISSUES BELOW    Eosinophilia- SUSPECT MAY BE FR UNDERLYING ASTHMA AND MILD FLARE, RECHECK AFTER RX AS BELOW  -     CBC Auto Differential; Future    Wheezing- EVAL W PFTS, FOR CURRENT SX SHORT COURSE OF PRED AND ALSO PRN INHALER. ADD SINGULAIR  -     Full PFT Study With Bronchodilator; Future  -     predniSONE (DELTASONE) 5 MG tablet; Take 6 tablets by mouth on day 1, 5 on day 2, 4 on day 3, 3 on day 4, 2 on day 5, 1 on day 6.  -     albuterol sulfate HFA (PROVENTIL HFA) 108 (90 Base) MCG/ACT inhaler; Inhale 2 puffs into the lungs every 6 hours as needed for Wheezing  -     montelukast (SINGULAIR) 10 MG tablet; Take 1 tablet by mouth daily    PCOS (polycystic ovarian syndrome)- CONT MEDS AND MONITOR--- FOR WT LOSS ALSO SUGGESTED TRIAL INT FASTING  -     spironolactone (ALDACTONE) 100 MG tablet; Take 1 tablet by mouth daily  -     metFORMIN (GLUCOPHAGE) 500 MG tablet; Take 2 tablets by mouth 2 times daily (with meals)    Hypothyroidism due to acquired atrophy of thyroid - W INCR TSH WILL ALSO INCR DOSE 5--88MCG AND F/U TFTS ONE MONTH  -     Discontinue: levothyroxine (SYNTHROID) 75 MCG tablet; Take 1 tablet by mouth daily  -     TSH without Reflex;  Future  -     T4, Free; Future  - levothyroxine (SYNTHROID) 88 MCG tablet; Take 1 tablet by mouth daily    Essential hypertension- The current medical regimen is effective;  continue present plan and medications. -     hydrALAZINE (APRESOLINE) 25 MG tablet; Take 1 tablet by mouth 2 times daily    Non-seasonal allergic rhinitis due to other allergic trigger- ADDING SINGULAIR, CONT FLONASE  -     fluticasone (FLONASE) 50 MCG/ACT nasal spray; 2 sprays by Nasal route daily  -     montelukast (SINGULAIR) 10 MG tablet;  Take 1 tablet by mouth daily

## 2020-06-24 ENCOUNTER — TELEPHONE (OUTPATIENT)
Dept: INTERNAL MEDICINE CLINIC | Age: 60
End: 2020-06-24

## 2020-07-08 ENCOUNTER — HOSPITAL ENCOUNTER (OUTPATIENT)
Age: 60
Discharge: HOME | End: 2020-07-08
Payer: COMMERCIAL

## 2020-07-08 PROCEDURE — U0002 COVID-19 LAB TEST NON-CDC: HCPCS

## 2020-07-14 LAB
SARS-COV-2: NOT DETECTED
SOURCE: NORMAL

## 2020-07-16 LAB
BASOPHILS ABSOLUTE: 0.1 /ΜL
BASOPHILS ABSOLUTE: 0.1 K/MM3 (ref 0–0.3)
BASOPHILS RELATIVE PERCENT: 1.2 %
BASOPHILS RELATIVE PERCENT: 1.2 % (ref 0–2)
DIFFERENTIAL TYPE: NORMAL
EOSINOPHILS ABSOLUTE: 0.4 /ΜL
EOSINOPHILS ABSOLUTE: 0.4 K/MM3 (ref 0–0.6)
EOSINOPHILS RELATIVE PERCENT: 4.3 %
EOSINOPHILS RELATIVE PERCENT: 4.3 % (ref 0–7)
HCT VFR BLD CALC: 40.9 % (ref 35–46)
HCT VFR BLD CALC: 40.9 % (ref 36–46)
HEMOGLOBIN: 13.7 G/DL (ref 12–15.6)
HEMOGLOBIN: 13.7 G/DL (ref 12–16)
LYMPHOCYTES ABSOLUTE: 2.9 /ΜL
LYMPHOCYTES ABSOLUTE: 2.9 K/MM3 (ref 0.9–4.1)
LYMPHOCYTES RELATIVE PERCENT: 32.4 %
LYMPHOCYTES RELATIVE PERCENT: 32.4 % (ref 18–47)
MCH RBC QN AUTO: 30.9 PG
MCH RBC QN AUTO: 30.9 PG (ref 27–33)
MCHC RBC AUTO-ENTMCNC: 33.6 G/DL
MCHC RBC AUTO-ENTMCNC: 33.6 G/DL (ref 32–36)
MCV RBC AUTO: 92 FL
MCV RBC AUTO: 92 FL (ref 80–100)
MONOCYTES ABSOLUTE: 0.6 /ΜL
MONOCYTES ABSOLUTE: 0.6 K/MM3 (ref 0.2–1.1)
MONOCYTES RELATIVE PERCENT: 7.1 %
MONOCYTES RELATIVE PERCENT: 7.1 % (ref 0–14)
NEUTROPHILS ABSOLUTE: 4.9 /ΜL
NEUTROPHILS ABSOLUTE: 4.9 K/MM3 (ref 1.5–7.8)
NEUTROPHILS RELATIVE PERCENT: 55 %
NEUTROPHILS RELATIVE PERCENT: 55 % (ref 40–75)
PDW BLD-RTO: 14 %
PDW BLD-RTO: 14 % (ref 9–15)
PLATELET # BLD: 333 K/MM3 (ref 130–400)
PLATELET # BLD: 333 K/ΜL
PMV BLD AUTO: NORMAL FL
RBC # BLD: 4.45 10^6/ΜL
RBC # BLD: 4.45 M/MM3 (ref 3.9–5.2)
TSH SERPL DL<=0.05 MIU/L-ACNC: 1.23 MCIU/ML (ref 0.4–4.5)
TSH SERPL DL<=0.05 MIU/L-ACNC: 1.23 UIU/ML
WBC # BLD: 8.9 10^3/ML
WBC: 8.9 K/MM3 (ref 3.8–10.8)

## 2020-07-17 DIAGNOSIS — E03.4 HYPOTHYROIDISM DUE TO ACQUIRED ATROPHY OF THYROID: ICD-10-CM

## 2020-07-17 DIAGNOSIS — D72.10 EOSINOPHILIA: ICD-10-CM

## 2020-07-23 ENCOUNTER — HOSPITAL ENCOUNTER (OUTPATIENT)
Dept: CARDIAC REHAB | Age: 60
Discharge: HOME | End: 2020-07-23
Payer: COMMERCIAL

## 2020-07-23 LAB
DLCO %PRED: 127 %
DLCO PRED: NORMAL
DLCO/VA %PRED: NORMAL
DLCO/VA PRED: NORMAL
DLCO/VA: NORMAL
DLCO: NORMAL
EXPIRATORY TIME-POST: NORMAL
EXPIRATORY TIME: NORMAL
FEF 25-75% %CHNG: NORMAL
FEF 25-75% %PRED-POST: NORMAL
FEF 25-75% %PRED-PRE: NORMAL
FEF 25-75% PRED: NORMAL
FEF 25-75%-POST: NORMAL
FEF 25-75%-PRE: NORMAL
FEV1 %PRED-POST: 102 %
FEV1 %PRED-PRE: 99 %
FEV1 PRED: NORMAL
FEV1-POST: NORMAL
FEV1-PRE: NORMAL
FEV1/FVC %PRED-POST: NORMAL
FEV1/FVC %PRED-PRE: NORMAL
FEV1/FVC PRED: NORMAL
FEV1/FVC-POST: 78 %
FEV1/FVC-PRE: 77 %
FVC %PRED-POST: NORMAL
FVC %PRED-PRE: NORMAL
FVC PRED: NORMAL
FVC-POST: NORMAL
FVC-PRE: NORMAL
GAW %PRED: NORMAL
GAW PRED: NORMAL
GAW: NORMAL
IC %PRED: NORMAL
IC PRED: NORMAL
IC: NORMAL
MEP: NORMAL
MIP: NORMAL
MVV %PRED-PRE: NORMAL
MVV PRED: NORMAL
MVV-PRE: NORMAL
PEF %PRED-POST: NORMAL
PEF %PRED-PRE: NORMAL
PEF PRED: NORMAL
PEF%CHNG: NORMAL
PEF-POST: NORMAL
PEF-PRE: NORMAL
RAW %PRED: NORMAL
RAW PRED: NORMAL
RAW: NORMAL
RV %PRED: NORMAL
RV PRED: NORMAL
RV: NORMAL
SVC %PRED: NORMAL
SVC PRED: NORMAL
SVC: NORMAL
TLC %PRED: 4.75 %
TLC PRED: NORMAL
TLC: NORMAL
VA %PRED: NORMAL
VA PRED: NORMAL
VA: NORMAL
VTG %PRED: NORMAL
VTG PRED: NORMAL
VTG: NORMAL

## 2020-07-23 PROCEDURE — 94729 DIFFUSING CAPACITY: CPT

## 2020-07-23 PROCEDURE — 94727 GAS DIL/WSHOT DETER LNG VOL: CPT

## 2020-07-23 PROCEDURE — 94060 EVALUATION OF WHEEZING: CPT

## 2020-07-23 ASSESSMENT — PULMONARY FUNCTION TESTS
FEV1_PERCENT_PREDICTED_PRE: 99
FEV1/FVC_PRE: 77
FEV1/FVC_POST: 78
FEV1_PERCENT_PREDICTED_POST: 102

## 2020-07-28 ENCOUNTER — OFFICE VISIT (OUTPATIENT)
Dept: INTERNAL MEDICINE CLINIC | Age: 60
End: 2020-07-28
Payer: COMMERCIAL

## 2020-07-28 VITALS
WEIGHT: 186 LBS | BODY MASS INDEX: 34.02 KG/M2 | DIASTOLIC BLOOD PRESSURE: 86 MMHG | OXYGEN SATURATION: 96 % | RESPIRATION RATE: 17 BRPM | SYSTOLIC BLOOD PRESSURE: 127 MMHG | HEART RATE: 74 BPM

## 2020-07-28 PROCEDURE — 99213 OFFICE O/P EST LOW 20 MIN: CPT | Performed by: INTERNAL MEDICINE

## 2020-07-28 NOTE — PROGRESS NOTES
Delphine Umaña  1960 07/28/20    SUBJECTIVE:    PFTs done- not sent to us so they are faxing. Has had some sporadic sneezing fits, allergies are much better now on singulair vs claritin. Prior fatigue is better w sl higher dose synthroid and TSH is lower. No cough, wheezing or SOB, no problems w push mowing the lawn. PFTS reviewed, suggesting minimal obs and no response. OBJECTIVE:    /86   Pulse 74   Resp 17   Wt 186 lb (84.4 kg)   SpO2 96%   BMI 34.02 kg/m²     Physical Exam  Vitals signs reviewed. Constitutional:       Appearance: She is well-developed. Neck:      Musculoskeletal: Neck supple. Cardiovascular:      Rate and Rhythm: Normal rate and regular rhythm. Heart sounds: Normal heart sounds. No murmur. No friction rub. No gallop. Pulmonary:      Effort: Pulmonary effort is normal. No respiratory distress. Breath sounds: Normal breath sounds. No wheezing or rales. Abdominal:      General: Bowel sounds are normal. There is no distension. Palpations: Abdomen is soft. Tenderness: There is no abdominal tenderness. Neurological:      Mental Status: She is alert. ASSESSMENT:    1. Mild intermittent reactive airway disease without complication    2. Hypothyroidism due to acquired atrophy of thyroid    3. Non-seasonal allergic rhinitis, unspecified trigger    4. Mixed hyperlipidemia        PLAN:    Morgan Pate was seen today for results. Diagnoses and all orders for this visit:    Mild intermittent reactive airway disease without complication- appears to have a nonspecific reactive airway dz, suspect mild asthma. Sx improved also w singulair and will cont this, monitor.  -     CBC Auto Differential; Future    Hypothyroidism due to acquired atrophy of thyroid- TSH is improved to ~1 range, cont current dose and monitor  -     TSH without Reflex;  Future  -     T4, Free; Future    Non-seasonal allergic rhinitis, unspecified trigger- also better on singulair, continue present management. Will monitor. Mixed hyperlipidemia- cont work w diet and exercise, wt loss. F/u fasting lab next appt 6mo  -     Comprehensive Metabolic Panel; Future  -     Lipid Panel; Future    Other orders- to check w pharmacy if covered  -     zoster recombinant adjuvanted vaccine [facility]) 50 MCG/0.5ML SUSR injection;  Inject 0.5 mLs into the muscle once for 1 dose

## 2020-08-26 ENCOUNTER — OFFICE VISIT (OUTPATIENT)
Dept: INTERNAL MEDICINE CLINIC | Age: 60
End: 2020-08-26
Payer: COMMERCIAL

## 2020-08-26 VITALS
SYSTOLIC BLOOD PRESSURE: 118 MMHG | HEART RATE: 75 BPM | RESPIRATION RATE: 17 BRPM | DIASTOLIC BLOOD PRESSURE: 83 MMHG | OXYGEN SATURATION: 97 %

## 2020-08-26 LAB
BILIRUBIN, POC: NORMAL
BLOOD URINE, POC: NORMAL
CLARITY, POC: CLEAR
COLOR, POC: YELLOW
GLUCOSE URINE, POC: NORMAL
KETONES, POC: NORMAL
LEUKOCYTE EST, POC: NORMAL
NITRITE, POC: NORMAL
PH, POC: 7
PROTEIN, POC: NORMAL
SPECIFIC GRAVITY, POC: 1.01
UROBILINOGEN, POC: 0.2

## 2020-08-26 PROCEDURE — 99213 OFFICE O/P EST LOW 20 MIN: CPT | Performed by: INTERNAL MEDICINE

## 2020-08-26 PROCEDURE — 81002 URINALYSIS NONAUTO W/O SCOPE: CPT | Performed by: INTERNAL MEDICINE

## 2020-08-26 RX ORDER — CIPROFLOXACIN 250 MG/1
250 TABLET, FILM COATED ORAL 2 TIMES DAILY
Qty: 14 TABLET | Refills: 0 | Status: SHIPPED | OUTPATIENT
Start: 2020-08-26 | End: 2020-09-02

## 2020-10-27 RX ORDER — SPIRONOLACTONE 100 MG/1
100 TABLET, FILM COATED ORAL 2 TIMES DAILY
Qty: 180 TABLET | Refills: 1 | Status: SHIPPED | OUTPATIENT
Start: 2020-10-27 | End: 2021-01-02 | Stop reason: SDUPTHER

## 2020-11-10 LAB
A/G RATIO: 2 (ref 1.1–2.2)
ALBUMIN SERPL-MCNC: 4.9 G/DL (ref 3.4–5)
ALP BLD-CCNC: 106 U/L (ref 40–129)
ALT SERPL-CCNC: 20 U/L (ref 10–40)
ANION GAP SERPL CALCULATED.3IONS-SCNC: 18 MMOL/L (ref 3–16)
AST SERPL-CCNC: 16 U/L (ref 15–37)
BASOPHILS ABSOLUTE: 0.1 K/UL (ref 0–0.2)
BASOPHILS RELATIVE PERCENT: 0.9 %
BILIRUB SERPL-MCNC: 0.3 MG/DL (ref 0–1)
BUN BLDV-MCNC: 13 MG/DL (ref 7–20)
CALCIUM SERPL-MCNC: 10.2 MG/DL (ref 8.3–10.6)
CHLORIDE BLD-SCNC: 103 MMOL/L (ref 99–110)
CHOLESTEROL, TOTAL: 228 MG/DL (ref 0–199)
CO2: 23 MMOL/L (ref 21–32)
CREAT SERPL-MCNC: 0.7 MG/DL (ref 0.6–1.2)
EOSINOPHILS ABSOLUTE: 0.2 K/UL (ref 0–0.6)
EOSINOPHILS RELATIVE PERCENT: 2.4 %
GFR AFRICAN AMERICAN: >60
GFR NON-AFRICAN AMERICAN: >60
GLOBULIN: 2.4 G/DL
GLUCOSE BLD-MCNC: 107 MG/DL (ref 70–99)
HCT VFR BLD CALC: 43.4 % (ref 36–48)
HDLC SERPL-MCNC: 73 MG/DL (ref 40–60)
HEMOGLOBIN: 14.2 G/DL (ref 12–16)
LDL CHOLESTEROL CALCULATED: 128 MG/DL
LYMPHOCYTES ABSOLUTE: 3.3 K/UL (ref 1–5.1)
LYMPHOCYTES RELATIVE PERCENT: 33.7 %
MCH RBC QN AUTO: 30.2 PG (ref 26–34)
MCHC RBC AUTO-ENTMCNC: 32.7 G/DL (ref 31–36)
MCV RBC AUTO: 92.5 FL (ref 80–100)
MONOCYTES ABSOLUTE: 0.6 K/UL (ref 0–1.3)
MONOCYTES RELATIVE PERCENT: 6.6 %
NEUTROPHILS ABSOLUTE: 5.5 K/UL (ref 1.7–7.7)
NEUTROPHILS RELATIVE PERCENT: 56.4 %
PDW BLD-RTO: 13.3 % (ref 12.4–15.4)
PLATELET # BLD: 379 K/UL (ref 135–450)
PMV BLD AUTO: 9.5 FL (ref 5–10.5)
POTASSIUM SERPL-SCNC: 5.1 MMOL/L (ref 3.5–5.1)
RBC # BLD: 4.69 M/UL (ref 4–5.2)
SODIUM BLD-SCNC: 144 MMOL/L (ref 136–145)
T4 FREE: 2 NG/DL (ref 0.9–1.8)
TOTAL PROTEIN: 7.3 G/DL (ref 6.4–8.2)
TRIGL SERPL-MCNC: 135 MG/DL (ref 0–150)
TSH SERPL DL<=0.05 MIU/L-ACNC: 0.59 UIU/ML (ref 0.27–4.2)
VLDLC SERPL CALC-MCNC: 27 MG/DL
WBC # BLD: 9.8 K/UL (ref 4–11)

## 2020-11-10 PROCEDURE — 36415 COLL VENOUS BLD VENIPUNCTURE: CPT | Performed by: INTERNAL MEDICINE

## 2020-11-11 NOTE — RESULT ENCOUNTER NOTE
Call pt, labs ok/chol ok- both sugar and chol sl higher so need cont work w diet and exercise. Chol incr fr [id number], glc fr . Thyroid fxn is high normal range, ok to cont current dosing but also would be reasonable to lower the dose.   CONT SAME DOSE FOR NOW AND WE'LL DISCUSS AT HER NEXT APPT

## 2021-01-28 ENCOUNTER — OFFICE VISIT (OUTPATIENT)
Dept: INTERNAL MEDICINE CLINIC | Age: 61
End: 2021-01-28
Payer: COMMERCIAL

## 2021-01-28 VITALS
WEIGHT: 178 LBS | HEART RATE: 60 BPM | SYSTOLIC BLOOD PRESSURE: 128 MMHG | DIASTOLIC BLOOD PRESSURE: 76 MMHG | RESPIRATION RATE: 16 BRPM | BODY MASS INDEX: 32.56 KG/M2 | OXYGEN SATURATION: 97 %

## 2021-01-28 DIAGNOSIS — I10 ESSENTIAL HYPERTENSION: ICD-10-CM

## 2021-01-28 DIAGNOSIS — R06.2 WHEEZING: ICD-10-CM

## 2021-01-28 DIAGNOSIS — E03.4 HYPOTHYROIDISM DUE TO ACQUIRED ATROPHY OF THYROID: Primary | ICD-10-CM

## 2021-01-28 DIAGNOSIS — E28.2 PCOS (POLYCYSTIC OVARIAN SYNDROME): ICD-10-CM

## 2021-01-28 DIAGNOSIS — J30.89 NON-SEASONAL ALLERGIC RHINITIS DUE TO OTHER ALLERGIC TRIGGER: ICD-10-CM

## 2021-01-28 DIAGNOSIS — E78.2 MIXED HYPERLIPIDEMIA: ICD-10-CM

## 2021-01-28 PROCEDURE — 99214 OFFICE O/P EST MOD 30 MIN: CPT | Performed by: INTERNAL MEDICINE

## 2021-01-28 RX ORDER — AZELASTINE 1 MG/ML
1 SPRAY, METERED NASAL 2 TIMES DAILY
Qty: 3 BOTTLE | Refills: 1 | Status: SHIPPED | OUTPATIENT
Start: 2021-01-28 | End: 2021-07-23 | Stop reason: SDUPTHER

## 2021-01-28 RX ORDER — HYDRALAZINE HYDROCHLORIDE 25 MG/1
25 TABLET, FILM COATED ORAL 2 TIMES DAILY
Qty: 180 TABLET | Refills: 1 | Status: SHIPPED | OUTPATIENT
Start: 2021-01-28 | End: 2021-07-23 | Stop reason: SDUPTHER

## 2021-01-28 RX ORDER — SPIRONOLACTONE 100 MG/1
100 TABLET, FILM COATED ORAL 2 TIMES DAILY
Qty: 180 TABLET | Refills: 1 | Status: SHIPPED | OUTPATIENT
Start: 2021-01-28 | End: 2021-07-23 | Stop reason: SDUPTHER

## 2021-01-28 RX ORDER — MONTELUKAST SODIUM 10 MG/1
10 TABLET ORAL DAILY
Qty: 90 TABLET | Refills: 1 | Status: SHIPPED | OUTPATIENT
Start: 2021-01-28 | End: 2021-07-23 | Stop reason: SDUPTHER

## 2021-01-28 RX ORDER — FLUTICASONE PROPIONATE 50 MCG
2 SPRAY, SUSPENSION (ML) NASAL DAILY
Qty: 3 BOTTLE | Refills: 1 | Status: SHIPPED | OUTPATIENT
Start: 2021-01-28 | End: 2021-07-23 | Stop reason: SDUPTHER

## 2021-01-28 RX ORDER — LEVOTHYROXINE SODIUM 88 UG/1
88 TABLET ORAL DAILY
Qty: 90 TABLET | Refills: 1 | Status: SHIPPED | OUTPATIENT
Start: 2021-01-28 | End: 2021-07-23 | Stop reason: SDUPTHER

## 2021-01-28 ASSESSMENT — PATIENT HEALTH QUESTIONNAIRE - PHQ9
2. FEELING DOWN, DEPRESSED OR HOPELESS: 0
SUM OF ALL RESPONSES TO PHQ QUESTIONS 1-9: 0
1. LITTLE INTEREST OR PLEASURE IN DOING THINGS: 0
SUM OF ALL RESPONSES TO PHQ QUESTIONS 1-9: 0
SUM OF ALL RESPONSES TO PHQ QUESTIONS 1-9: 0

## 2021-01-28 NOTE — PROGRESS NOTES
Ester Montesinos  1960 01/28/21    SUBJECTIVE:    Hypothyroid has been asymptomatic w/o sx of fatigue, constipation, cold intolerance, depression. Cont significant wt loss, fr prior 186-- 178#. She feels water retention has improved w incr dose aldactone. Her renal fxn and lytes espec K are nl range fr 1/19. This winter is the worst for allergies, sneezing fits noted at times. Also ears can plug up. On flonase and singulair   not tried astelin. Lipids:  Has history of high cholesterol, not on medication but trying to continue regular low fat diet and maintenance of weight, regular exercise. Hypertension: Stable. Denies CP, SOB, cough, visual changes, dizziness, palpitations or HA. OBJECTIVE:    /76   Pulse 60   Resp 16   Wt 178 lb (80.7 kg)   SpO2 97%   BMI 32.56 kg/m²     Physical Exam  Vitals signs reviewed. Constitutional:       Appearance: She is well-developed. HENT:      Head: Normocephalic and atraumatic. Eyes:      General: No scleral icterus. Right eye: No discharge. Left eye: No discharge. Conjunctiva/sclera: Conjunctivae normal.      Pupils: Pupils are equal, round, and reactive to light. Neck:      Musculoskeletal: Neck supple. Thyroid: No thyromegaly. Vascular: No JVD. Trachea: No tracheal deviation. Cardiovascular:      Rate and Rhythm: Normal rate and regular rhythm. Heart sounds: Normal heart sounds. No murmur. No friction rub. No gallop. Pulmonary:      Effort: Pulmonary effort is normal. No respiratory distress. Breath sounds: Normal breath sounds. No wheezing or rales. Abdominal:      General: Bowel sounds are normal. There is no distension. Palpations: Abdomen is soft. There is no mass. Tenderness: There is no abdominal tenderness. There is no guarding or rebound. Hernia: No hernia is present. Lymphadenopathy:      Cervical: No cervical adenopathy.    Skin: General: Skin is warm and dry. Neurological:      Mental Status: She is alert. Psychiatric:         Mood and Affect: Mood normal.         ASSESSMENT:    1. Hypothyroidism due to acquired atrophy of thyroid    2. PCOS (polycystic ovarian syndrome)    3. Non-seasonal allergic rhinitis due to other allergic trigger    4. Wheezing    5. Essential hypertension    6. Mixed hyperlipidemia        PLAN:    Colleen Meyer was seen today for hypothyroidism. Diagnoses and all orders for this visit:    Hypothyroidism due to acquired atrophy of thyroid - Clinically hypothyroidism appears stable and will continue current dosing, also periodic monitoring of TFTs. -     levothyroxine (SYNTHROID) 88 MCG tablet; Take 1 tablet by mouth daily  -     TSH without Reflex; Future  -     T4, Free; Future    PCOS (polycystic ovarian syndrome)- monserrat higher dose aldactone, cont metformin   -     spironolactone (ALDACTONE) 100 MG tablet; Take 1 tablet by mouth 2 times daily  -     metFORMIN (GLUCOPHAGE) 500 MG tablet; Take 2 tablets by mouth 2 times daily (with meals)    Non-seasonal allergic rhinitis due to other allergic trigger- mild flare, suggested also since sx worse at night to consider changing her pilllow. Add astelin to regimen  -     montelukast (SINGULAIR) 10 MG tablet; Take 1 tablet by mouth daily  -     fluticasone (FLONASE) 50 MCG/ACT nasal spray; 2 sprays by Nasal route daily  -     azelastine (ASTELIN) 0.1 % nasal spray; 1 spray by Nasal route 2 times daily Use in each nostril as directed    Wheezing- The current medical regimen is effective;  continue present plan and medications. -     montelukast (SINGULAIR) 10 MG tablet; Take 1 tablet by mouth daily    Essential hypertension - Blood pressure stable and will continue current regimen. Will plan periodic monitoring of renal function, electrolytes, lipid profile. -     hydrALAZINE (APRESOLINE) 25 MG tablet;  Take 1 tablet by mouth 2 times daily -     Comprehensive Metabolic Panel; Future  -     CBC Auto Differential; Future  -     Lipid Panel; Future    Mixed hyperlipidemia  - Pt will continue to work on a low fat diet and also exercise, wt loss as appropriate. Will continue periodic monitoring of fasting lipid profile, glucose, liver function. -     Comprehensive Metabolic Panel; Future  -     CBC Auto Differential; Future  -     Lipid Panel;  Future

## 2021-07-23 ENCOUNTER — OFFICE VISIT (OUTPATIENT)
Dept: INTERNAL MEDICINE CLINIC | Age: 61
End: 2021-07-23
Payer: COMMERCIAL

## 2021-07-23 VITALS
SYSTOLIC BLOOD PRESSURE: 126 MMHG | HEIGHT: 62 IN | OXYGEN SATURATION: 98 % | DIASTOLIC BLOOD PRESSURE: 76 MMHG | HEART RATE: 63 BPM | BODY MASS INDEX: 32.76 KG/M2 | WEIGHT: 178 LBS | RESPIRATION RATE: 16 BRPM

## 2021-07-23 DIAGNOSIS — Z00.00 ROUTINE GENERAL MEDICAL EXAMINATION AT A HEALTH CARE FACILITY: Primary | ICD-10-CM

## 2021-07-23 DIAGNOSIS — J30.89 NON-SEASONAL ALLERGIC RHINITIS DUE TO OTHER ALLERGIC TRIGGER: ICD-10-CM

## 2021-07-23 DIAGNOSIS — I10 ESSENTIAL HYPERTENSION: ICD-10-CM

## 2021-07-23 DIAGNOSIS — E78.2 MIXED HYPERLIPIDEMIA: ICD-10-CM

## 2021-07-23 DIAGNOSIS — R06.2 WHEEZING: ICD-10-CM

## 2021-07-23 DIAGNOSIS — E28.2 PCOS (POLYCYSTIC OVARIAN SYNDROME): ICD-10-CM

## 2021-07-23 DIAGNOSIS — Z12.31 VISIT FOR SCREENING MAMMOGRAM: ICD-10-CM

## 2021-07-23 DIAGNOSIS — E03.4 HYPOTHYROIDISM DUE TO ACQUIRED ATROPHY OF THYROID: ICD-10-CM

## 2021-07-23 PROCEDURE — 99396 PREV VISIT EST AGE 40-64: CPT | Performed by: INTERNAL MEDICINE

## 2021-07-23 RX ORDER — HYDRALAZINE HYDROCHLORIDE 25 MG/1
25 TABLET, FILM COATED ORAL 2 TIMES DAILY
Qty: 180 TABLET | Refills: 1 | Status: SHIPPED | OUTPATIENT
Start: 2021-07-23 | End: 2022-01-24 | Stop reason: SDUPTHER

## 2021-07-23 RX ORDER — MONTELUKAST SODIUM 10 MG/1
10 TABLET ORAL DAILY
Qty: 90 TABLET | Refills: 1 | Status: SHIPPED | OUTPATIENT
Start: 2021-07-23 | End: 2022-01-24 | Stop reason: SDUPTHER

## 2021-07-23 RX ORDER — AZELASTINE 1 MG/ML
1 SPRAY, METERED NASAL 2 TIMES DAILY
Qty: 3 BOTTLE | Refills: 1 | Status: SHIPPED | OUTPATIENT
Start: 2021-07-23 | End: 2022-09-28

## 2021-07-23 RX ORDER — SPIRONOLACTONE 100 MG/1
100 TABLET, FILM COATED ORAL 2 TIMES DAILY
Qty: 180 TABLET | Refills: 1 | Status: SHIPPED | OUTPATIENT
Start: 2021-07-23 | End: 2022-01-24 | Stop reason: SDUPTHER

## 2021-07-23 RX ORDER — LEVOTHYROXINE SODIUM 88 UG/1
88 TABLET ORAL DAILY
Qty: 90 TABLET | Refills: 1 | Status: SHIPPED | OUTPATIENT
Start: 2021-07-23 | End: 2022-01-24 | Stop reason: SDUPTHER

## 2021-07-23 RX ORDER — FLUTICASONE PROPIONATE 50 MCG
2 SPRAY, SUSPENSION (ML) NASAL DAILY
Qty: 3 BOTTLE | Refills: 1 | Status: SHIPPED | OUTPATIENT
Start: 2021-07-23

## 2021-07-23 NOTE — PROGRESS NOTES
Internal Medicine  History and Physical        Corrina Tucker  YOB: 1960    Date of Service:  7/23/2021      Chief Complaint:   Corrina Tucker is a 59 y.o. female who presents for a comprehensive physical    HPI:   Wt is steady, cont exercise and recently tolerated a big hike. Hypertension: Stable. Denies CP, SOB, cough, visual changes, dizziness, palpitations or HA. Pcos, on metformin. DID REC ALSO TO TRY AN EXERCISE PEDDLER UP TO 1-2 HRS/DAY. Hypothyroid has been asymptomatic w/o sx of fatigue, constipation, cold intolerance, depression.           Patient Active Problem List   Diagnosis    Fibromyalgia    Hypertension    PCOS (polycystic ovarian syndrome)    Family history of colon cancer    Allergic rhinitis    Former smoker    Fatigue    Family history of coronary artery disease    SOBOE (shortness of breath on exertion)    Multiple thyroid nodules    H/O cardiovascular stress test    HARSHA (obstructive sleep apnea)    Mixed hyperlipidemia    GERD (gastroesophageal reflux disease)    Hypothyroidism due to acquired atrophy of thyroid    Non morbid obesity due to excess calories    S/P total hysterectomy and bilateral salpingo-oophorectomy    Reactive airway disease       Preventive Care:  Health Maintenance   Topic Date Due    HIV screen  Never done    Shingles Vaccine (1 of 2) Never done    DTaP/Tdap/Td vaccine (2 - Td or Tdap) 10/29/2018    Flu vaccine (1) 09/01/2021    Breast cancer screen  01/24/2022    Potassium monitoring  07/13/2022    Creatinine monitoring  07/13/2022    Lipid screen  07/13/2026    Colon cancer screen colonoscopy  01/28/2030    COVID-19 Vaccine  Completed    Hepatitis C screen  Completed    Hepatitis A vaccine  Aged Out    Hepatitis B vaccine  Aged Out    Hib vaccine  Aged Out    Meningococcal (ACWY) vaccine  Aged Out    Pneumococcal 0-64 years Vaccine  Aged Out         Lipid panel:   Lab Results   Component Value Date TRIG 92 07/13/2021    HDL 70 07/13/2021    LDLCALC 107 07/13/2021        Immunization History   Administered Date(s) Administered    Hepatitis A Vaccine 03/10/2005, 09/26/2005    Hepatitis B Adult (Recombivax HB) 04/13/1994, 05/11/1994, 10/19/1994    Influenza Virus Vaccine 09/08/2014, 10/26/2018    Influenza, Intradermal, Preservative free 12/07/2015    Influenza, Huma Ferreiramann, IM, (6 mo and older Fluzone, Flulaval, Fluarix and 3 yrs and older Afluria) 10/31/2016    Influenza, Huma Reichmann, IM, PF (6 mo and older Fluzone, Flulaval, Fluarix, and 3 yrs and older Afluria) 10/25/2017, 11/20/2019    MMR 05/11/1994    Meningococcal MCV4P (Menactra) 10/29/2008    Polio IPV (IPOL) 10/29/2008    Td vaccine (adult) 03/10/2005    Tdap (Boostrix, Adacel) 10/29/2008    Typhoid Vi capsular polysaccharide (Typhim VI) 10/29/2008    Yellow Fever (YF-Vax) 10/29/2008       Allergies   Allergen Reactions    Norvasc [Amlodipine]      Leg swelling     Current Outpatient Medications   Medication Sig Dispense Refill    spironolactone (ALDACTONE) 100 MG tablet Take 1 tablet by mouth 2 times daily 180 tablet 1    levothyroxine (SYNTHROID) 88 MCG tablet Take 1 tablet by mouth daily 90 tablet 1    montelukast (SINGULAIR) 10 MG tablet Take 1 tablet by mouth daily 90 tablet 1    hydrALAZINE (APRESOLINE) 25 MG tablet Take 1 tablet by mouth 2 times daily 180 tablet 1    fluticasone (FLONASE) 50 MCG/ACT nasal spray 2 sprays by Nasal route daily 3 Bottle 1    azelastine (ASTELIN) 0.1 % nasal spray 1 spray by Nasal route 2 times daily Use in each nostril as directed 3 Bottle 1    albuterol sulfate HFA (PROVENTIL HFA) 108 (90 Base) MCG/ACT inhaler Inhale 2 puffs into the lungs every 6 hours as needed for Wheezing 1 Inhaler 3    Estriol-Estradiol (BI-EST 50:50) CREA Place onto the skin daily      loratadine (CLARITIN) 10 MG tablet Take 1 tablet by mouth daily as needed (allergies) 30 tablet 5    aspirin 81 MG tablet Take 81 mg by mouth Marital Status:    Intimate Partner Violence:     Fear of Current or Ex-Partner:     Emotionally Abused:     Physically Abused:     Sexually Abused:        Review of Systems:  A comprehensive review of systems was negative except for what was noted in the HPI. Wt Readings from Last 3 Encounters:   07/23/21 168 lb (76.2 kg)   01/28/21 178 lb (80.7 kg)   07/28/20 186 lb (84.4 kg)     BP Readings from Last 3 Encounters:   07/23/21 126/76   01/28/21 128/76   08/26/20 118/83       Physical Exam:   Vitals:    07/23/21 1016   BP: 126/76   Pulse: 63   Resp: 16   SpO2: 98%   Weight: 168 lb (76.2 kg)   Height: 5' 2\" (1.575 m)     Body mass index is 30.73 kg/m². Constitutional: She is oriented to person, place, and time. She appears well-developed and well-nourished. No distress. HEENT:  Head: Normocephalic and atraumatic. Eyes: Conjunctivae and extraocular motions are normal. Pupils are equal, round, and reactive to light. Neck: Neck supple. No JVD present. No mass and no thyromegaly present. Cardiovascular: Normal rate, regular rhythm, normal heart sounds and intact distal pulses. Exam reveals no gallop and no friction rub. No murmur heard. Pulmonary/Chest: Effort normal and breath sounds normal. No respiratory distress. She has no wheezes, rhonchi or rales. Abdominal: Soft, non-tender. Bowel sounds and aorta are normal. She exhibits no organomegaly, mass or bruit. Musculoskeletal: Normal range of motion, no synovitis. She exhibits no edema. Neurological: She is alert and oriented to person, place, and time. No cranial nerve deficit. Skin: Skin is warm and dry. There is no rash or erythema. Psychiatric: She has a normal mood and affect.  Her speech is normal and behavior is normal. Judgment, cognition and memory are normal.         Assessment/Plan:  Jelani Purvis was seen today for hypertension and annual exam.    Diagnoses and all orders for this visit:    Routine general medical examination at a health care facility- Overall general medical exam appears benign, other assessments as noted and testing is as noted below. SHE'LL CONSIDER EXERCISE PEDDLER 1-2 HRS/DAY TO HELP W WT LOSS    Essential hypertension - Blood pressure stable and will continue current regimen. Will plan periodic monitoring of renal function, electrolytes, lipid profile. -     hydrALAZINE (APRESOLINE) 25 MG tablet; Take 1 tablet by mouth 2 times daily  -     Comprehensive Metabolic Panel; Future  -     CBC Auto Differential; Future  -     Lipid Panel; Future    Hypothyroidism due to acquired atrophy of thyroid  - Clinically hypothyroidism appears stable and will continue current dosing, also periodic monitoring of TFTs. -     levothyroxine (SYNTHROID) 88 MCG tablet; Take 1 tablet by mouth daily  -     TSH without Reflex; Future  -     T4, Free; Future    PCOS (polycystic ovarian syndrome)- cont meds and also monitor lab  -     spironolactone (ALDACTONE) 100 MG tablet; Take 1 tablet by mouth 2 times daily  -     metFORMIN (GLUCOPHAGE) 500 MG tablet; Take 2 tablets by mouth 2 times daily (with meals)    Non-seasonal allergic rhinitis due to other allergic trigger - Allergies controlled on current medical regimen which will be continued. -     montelukast (SINGULAIR) 10 MG tablet; Take 1 tablet by mouth daily  -     fluticasone (FLONASE) 50 MCG/ACT nasal spray; 2 sprays by Nasal route daily  -     azelastine (ASTELIN) 0.1 % nasal spray; 1 spray by Nasal route 2 times daily Use in each nostril as directed    Wheezing  - The current medical regimen is effective;  continue present plan and medications. -     montelukast (SINGULAIR) 10 MG tablet; Take 1 tablet by mouth daily    Mixed hyperlipidemia- Pt will continue to work on a low fat diet and also exercise, wt loss as appropriate. Will continue periodic monitoring of fasting lipid profile, glucose, liver function. -     Comprehensive Metabolic Panel;  Future  -     CBC Auto Differential; Future  -     Lipid Panel; Future    Visit for screening mammogram  -     [facility] DIGITAL SCREEN W CAD BILATERAL;  Future

## 2022-01-24 ENCOUNTER — OFFICE VISIT (OUTPATIENT)
Dept: INTERNAL MEDICINE CLINIC | Age: 62
End: 2022-01-24
Payer: COMMERCIAL

## 2022-01-24 VITALS
OXYGEN SATURATION: 97 % | BODY MASS INDEX: 30.91 KG/M2 | RESPIRATION RATE: 16 BRPM | HEART RATE: 65 BPM | DIASTOLIC BLOOD PRESSURE: 70 MMHG | WEIGHT: 169 LBS | SYSTOLIC BLOOD PRESSURE: 124 MMHG

## 2022-01-24 DIAGNOSIS — E78.2 MIXED HYPERLIPIDEMIA: ICD-10-CM

## 2022-01-24 DIAGNOSIS — E28.2 PCOS (POLYCYSTIC OVARIAN SYNDROME): ICD-10-CM

## 2022-01-24 DIAGNOSIS — B37.2 CANDIDA INFECTION OF FLEXURAL SKIN: ICD-10-CM

## 2022-01-24 DIAGNOSIS — E03.4 HYPOTHYROIDISM DUE TO ACQUIRED ATROPHY OF THYROID: ICD-10-CM

## 2022-01-24 DIAGNOSIS — R06.2 WHEEZING: ICD-10-CM

## 2022-01-24 DIAGNOSIS — J30.89 NON-SEASONAL ALLERGIC RHINITIS DUE TO OTHER ALLERGIC TRIGGER: ICD-10-CM

## 2022-01-24 DIAGNOSIS — I10 ESSENTIAL HYPERTENSION: Primary | ICD-10-CM

## 2022-01-24 DIAGNOSIS — Z12.31 VISIT FOR SCREENING MAMMOGRAM: ICD-10-CM

## 2022-01-24 DIAGNOSIS — E65 PANNUS, ABDOMINAL: ICD-10-CM

## 2022-01-24 PROCEDURE — 99214 OFFICE O/P EST MOD 30 MIN: CPT | Performed by: INTERNAL MEDICINE

## 2022-01-24 RX ORDER — FLUTICASONE PROPIONATE 50 MCG
2 SPRAY, SUSPENSION (ML) NASAL DAILY
Qty: 3 EACH | Refills: 1 | Status: CANCELLED | OUTPATIENT
Start: 2022-01-24

## 2022-01-24 RX ORDER — HYDRALAZINE HYDROCHLORIDE 25 MG/1
25 TABLET, FILM COATED ORAL 2 TIMES DAILY
Qty: 180 TABLET | Refills: 1 | Status: SHIPPED | OUTPATIENT
Start: 2022-01-24 | End: 2022-07-11 | Stop reason: SDUPTHER

## 2022-01-24 RX ORDER — LEVOTHYROXINE SODIUM 88 UG/1
88 TABLET ORAL DAILY
Qty: 90 TABLET | Refills: 1 | Status: SHIPPED | OUTPATIENT
Start: 2022-01-24 | End: 2022-07-11 | Stop reason: SDUPTHER

## 2022-01-24 RX ORDER — AZELASTINE 1 MG/ML
1 SPRAY, METERED NASAL 2 TIMES DAILY
Qty: 3 EACH | Refills: 1 | Status: CANCELLED | OUTPATIENT
Start: 2022-01-24

## 2022-01-24 RX ORDER — SPIRONOLACTONE 100 MG/1
100 TABLET, FILM COATED ORAL 2 TIMES DAILY
Qty: 180 TABLET | Refills: 1 | Status: SHIPPED | OUTPATIENT
Start: 2022-01-24 | End: 2022-07-11 | Stop reason: SDUPTHER

## 2022-01-24 RX ORDER — MONTELUKAST SODIUM 10 MG/1
10 TABLET ORAL DAILY
Qty: 90 TABLET | Refills: 1 | Status: SHIPPED | OUTPATIENT
Start: 2022-01-24 | End: 2022-07-11 | Stop reason: SDUPTHER

## 2022-01-24 RX ORDER — CLOTRIMAZOLE AND BETAMETHASONE DIPROPIONATE 10; .64 MG/G; MG/G
CREAM TOPICAL
Qty: 45 G | Refills: 1 | Status: SHIPPED | OUTPATIENT
Start: 2022-01-24

## 2022-01-24 RX ORDER — NYSTATIN 100000 [USP'U]/G
POWDER TOPICAL
Qty: 60 G | Refills: 1 | Status: SHIPPED | OUTPATIENT
Start: 2022-01-24 | End: 2022-07-11 | Stop reason: SDUPTHER

## 2022-01-24 NOTE — PROGRESS NOTES
Miguel De Jesus  1960 01/24/22    SUBJECTIVE:    HAD TRIP OUT WEST W LOTS OF HIKING THEN HAS CONT EXERCISING AND DIET. DOING REFIT. DID HAVE COVID AROUND XMAS, PRIOR FATIGUE HAS IMPROVED. DID NOT HAVE BOOSTER BUT [address] [place] [address] PLAN THIS FOR MARCH. Hypertension: Stable. Denies CP, SOB, cough, visual changes, dizziness, palpitations or HA. DUE FOR MAMMOGRAM LATER THIS MO FOR 2 YR CHECK. PCOS, STABLE ON MEDS INCL ALDACTONE, METFORMIN    SOME RECURRING LOWER ABD YEAST INFECTION, WE'LL TRY LOTRISONE NOW FOR AC INFECTION, THEN   PRN POWDER NYSTATIN    Lipids:  Has history of high cholesterol, not on medication but trying to continue regular low fat diet and maintenance of weight, regular exercise. OBJECTIVE:    /70   Pulse 65   Resp 16   Wt 169 lb (76.7 kg)   SpO2 97%   BMI 30.91 kg/m²     Physical Exam  Vitals reviewed. Constitutional:       Appearance: She is well-developed. HENT:      Head: Normocephalic and atraumatic. Eyes:      General: No scleral icterus. Right eye: No discharge. Left eye: No discharge. Conjunctiva/sclera: Conjunctivae normal.      Pupils: Pupils are equal, round, and reactive to light. Neck:      Thyroid: No thyromegaly. Vascular: No JVD. Trachea: No tracheal deviation. Cardiovascular:      Rate and Rhythm: Normal rate and regular rhythm. Heart sounds: Normal heart sounds. No murmur heard. No friction rub. No gallop. Pulmonary:      Effort: Pulmonary effort is normal. No respiratory distress. Breath sounds: Normal breath sounds. No wheezing or rales. Abdominal:      General: Bowel sounds are normal. There is no distension. Palpations: Abdomen is soft. There is no mass. Tenderness: There is no abdominal tenderness. There is no guarding or rebound. Hernia: No hernia is present.           Comments: SOME ERYTHEMA AT LOWER WAISTLINE BELOW SMALL PANNUS   Musculoskeletal:      Cervical back: Neck supple. Lymphadenopathy:      Cervical: No cervical adenopathy. Skin:     General: Skin is warm and dry. Neurological:      General: No focal deficit present. Mental Status: She is alert. Psychiatric:         Mood and Affect: Mood normal.         ASSESSMENT:    1. Essential hypertension    2. Hypothyroidism due to acquired atrophy of thyroid    3. PCOS (polycystic ovarian syndrome)    4. Non-seasonal allergic rhinitis due to other allergic trigger    5. Wheezing    6. Visit for screening mammogram    7. Candida infection of flexural skin    8. Pannus, abdominal    9. Mixed hyperlipidemia        PLAN:    Becky Wooten was seen today for hypothyroidism. Diagnoses and all orders for this visit:    Essential hypertension - Blood pressure stable and will continue current regimen. Will plan periodic monitoring of renal function, electrolytes, lipid profile. -     hydrALAZINE (APRESOLINE) 25 MG tablet; Take 1 tablet by mouth 2 times daily  -     CBC Auto Differential; Future  -     Lipid Panel; Future  -     Comprehensive Metabolic Panel; Future    Hypothyroidism due to acquired atrophy of thyroid - Clinically hypothyroidism appears stable and will continue current dosing, also periodic monitoring of TFTs. -     levothyroxine (SYNTHROID) 88 MCG tablet; Take 1 tablet by mouth daily  -     TSH without Reflex; Future  -     T4, Free; Future    PCOS (polycystic ovarian syndrome)- RESPONDING WELL TO ALDACTONE AND ALSO METFORMIN, LOSING WT AND MAINTAINING EXERCISE, continue present management. Will monitor. -     spironolactone (ALDACTONE) 100 MG tablet; Take 1 tablet by mouth 2 times daily    Non-seasonal allergic rhinitis due to other allergic trigger - Allergies controlled on current medical regimen which will be continued. -     montelukast (SINGULAIR) 10 MG tablet; Take 1 tablet by mouth daily    Wheezing- The current medical regimen is effective;  continue present plan and medications.     - montelukast (SINGULAIR) 10 MG tablet; Take 1 tablet by mouth daily    Visit for screening mammogram  -     [facility] DIGITAL SCREEN W CAD BILATERAL; Future    Candida infection of flexural skin- TRIAL LOTRISONE FOR MORE SEVERE FLARE, THEN FOR MILDER ONES IF RECURS IN THE FUTURE TRIAL PRN NYSTATIN POWDER  -     clotrimazole-betamethasone (LOTRISONE) 1-0.05 % cream; Apply topically 2 times daily. -     nystatin (MYCOSTATIN) 973795 UNIT/GM powder; Apply 3 times daily. Pannus, abdominal- FOR POSSIBLE EVAL PANNUS RESECTION AT [facility]  -     External Referral To Plastic Surgery    Mixed hyperlipidemia - Pt will continue to work on a low fat diet and also exercise, wt loss as appropriate. Will continue periodic monitoring of fasting lipid profile, glucose, liver function.    -     Lipid Panel; Future  -     Comprehensive Metabolic Panel;  Future

## 2022-01-24 NOTE — PROGRESS NOTES
Referral faxed to Dr. Alfonzo Horvath at [address] [place] [address]. [facility] will contact patient to schedule.

## 2022-02-01 DIAGNOSIS — E28.2 PCOS (POLYCYSTIC OVARIAN SYNDROME): ICD-10-CM

## 2022-02-10 DIAGNOSIS — Z12.31 VISIT FOR SCREENING MAMMOGRAM: ICD-10-CM

## 2022-07-06 LAB
ABSOLUTE IMMATURE GRANULOCYTE: 0 K/UL (ref 0–0.1)
ALBUMIN/GLOBULIN RATIO: 2.5 RATIO (ref 0.8–2.6)
ALBUMIN: 4.2 G/DL (ref 3.5–5.2)
ALP BLD-CCNC: 67 U/L (ref 23–144)
ALT SERPL-CCNC: 18 U/L (ref 0–60)
AST SERPL-CCNC: 14 U/L (ref 0–55)
BASOPHILS ABSOLUTE: 0.2 K/UL (ref 0–0.3)
BASOPHILS RELATIVE PERCENT: 1.6 % (ref 0–2)
BILIRUB SERPL-MCNC: 0.3 MG/DL (ref 0–1.2)
BUN BLDV-MCNC: 16 MG/DL (ref 3–29)
BUN/CREAT BLD: 20 (ref 7–25)
CALCIUM SERPL-MCNC: 10 MG/DL (ref 8.5–10.5)
CHLORIDE BLD-SCNC: 103 MEQ/L (ref 96–110)
CHOLESTEROL: 196 MG/DL
CO2: 28 MEQ/L (ref 19–32)
CREAT SERPL-MCNC: 0.8 MG/DL (ref 0.5–1.2)
DIFFERENTIAL TYPE: ABNORMAL
EOSINOPHILS ABSOLUTE: 1 K/UL (ref 0–0.5)
EOSINOPHILS RELATIVE PERCENT: 9.9 % (ref 0–5)
GLOBULIN: 1.7 G/DL (ref 1.9–3.6)
GLOMERULAR FILTRATION RATE: 82 MLS/MIN/1.73M2
GLUCOSE BLD-MCNC: 89 MG/DL (ref 70–99)
HCT VFR BLD CALC: 38.6 % (ref 34–49)
HDLC SERPL-MCNC: 61 MG/DL
HEMOGLOBIN: 13 G/DL (ref 11.2–15.7)
IMMATURE GRANULOCYTES: 0.2 %
LDL CHOLESTEROL CALCULATED: 116 MG/DL
LYMPHOCYTES ABSOLUTE: 3.6 K/UL (ref 0.9–4.1)
LYMPHOCYTES RELATIVE PERCENT: 37.4 % (ref 14–51)
MCH RBC QN AUTO: 29.6 PG (ref 26–34)
MCHC RBC AUTO-ENTMCNC: 33.7 G/DL (ref 30.7–35.5)
MCV RBC AUTO: 87.9 FL (ref 80–100)
MONOCYTES ABSOLUTE: 0.6 K/UL (ref 0.2–1)
MONOCYTES RELATIVE PERCENT: 6.4 % (ref 4–12)
NEUTROPHILS ABSOLUTE: 4.3 K/UL (ref 1.8–7.5)
NEUTROPHILS RELATIVE PERCENT: 44.5 % (ref 42–80)
NUCLEATED RBCS: 0 /100 WBC
PDW BLD-RTO: 11.9 %
PLATELET # BLD: 345 K/UL (ref 140–400)
PMV BLD AUTO: 11 FL (ref 7.2–11.7)
POTASSIUM SERPL-SCNC: 5 MEQ/L (ref 3.4–5.3)
RBC # BLD: 4.39 M/UL (ref 3.95–5.26)
SODIUM BLD-SCNC: 141 MEQ/L (ref 135–148)
STATUS: ABNORMAL
T4 FREE: 1.66 NG/DL (ref 0.8–1.8)
TOTAL PROTEIN: 5.9 G/DL (ref 6–8.3)
TRIGL SERPL-MCNC: 93 MG/DL
TSH SERPL DL<=0.05 MIU/L-ACNC: 1.73 MCIU/ML (ref 0.4–4.5)
VLDLC SERPL CALC-MCNC: 19 MG/DL (ref 4–38)
WBC: 9.7 K/UL (ref 3.5–10.9)

## 2022-07-11 ENCOUNTER — OFFICE VISIT (OUTPATIENT)
Dept: INTERNAL MEDICINE CLINIC | Age: 62
End: 2022-07-11
Payer: COMMERCIAL

## 2022-07-11 VITALS
BODY MASS INDEX: 30.36 KG/M2 | DIASTOLIC BLOOD PRESSURE: 74 MMHG | RESPIRATION RATE: 14 BRPM | HEART RATE: 69 BPM | OXYGEN SATURATION: 98 % | SYSTOLIC BLOOD PRESSURE: 126 MMHG | WEIGHT: 166 LBS

## 2022-07-11 DIAGNOSIS — Z00.00 ROUTINE GENERAL MEDICAL EXAMINATION AT A HEALTH CARE FACILITY: Primary | ICD-10-CM

## 2022-07-11 DIAGNOSIS — E28.2 PCOS (POLYCYSTIC OVARIAN SYNDROME): ICD-10-CM

## 2022-07-11 DIAGNOSIS — E03.4 HYPOTHYROIDISM DUE TO ACQUIRED ATROPHY OF THYROID: ICD-10-CM

## 2022-07-11 DIAGNOSIS — I10 ESSENTIAL HYPERTENSION: ICD-10-CM

## 2022-07-11 DIAGNOSIS — R06.2 WHEEZING: ICD-10-CM

## 2022-07-11 DIAGNOSIS — J30.89 NON-SEASONAL ALLERGIC RHINITIS DUE TO OTHER ALLERGIC TRIGGER: ICD-10-CM

## 2022-07-11 DIAGNOSIS — E78.2 MIXED HYPERLIPIDEMIA: ICD-10-CM

## 2022-07-11 DIAGNOSIS — B37.2 CANDIDA INFECTION OF FLEXURAL SKIN: ICD-10-CM

## 2022-07-11 DIAGNOSIS — Z23 NEED FOR DIPHTHERIA-TETANUS-PERTUSSIS (TDAP) VACCINE: ICD-10-CM

## 2022-07-11 PROCEDURE — 99396 PREV VISIT EST AGE 40-64: CPT | Performed by: INTERNAL MEDICINE

## 2022-07-11 PROCEDURE — 90471 IMMUNIZATION ADMIN: CPT | Performed by: INTERNAL MEDICINE

## 2022-07-11 PROCEDURE — 90715 TDAP VACCINE 7 YRS/> IM: CPT | Performed by: INTERNAL MEDICINE

## 2022-07-11 RX ORDER — ALBUTEROL SULFATE 90 UG/1
2 AEROSOL, METERED RESPIRATORY (INHALATION) EVERY 6 HOURS PRN
Qty: 3 EACH | Refills: 1 | Status: SHIPPED | OUTPATIENT
Start: 2022-07-11

## 2022-07-11 RX ORDER — SPIRONOLACTONE 100 MG/1
100 TABLET, FILM COATED ORAL 2 TIMES DAILY
Qty: 180 TABLET | Refills: 1 | Status: SHIPPED | OUTPATIENT
Start: 2022-07-11

## 2022-07-11 RX ORDER — MONTELUKAST SODIUM 10 MG/1
10 TABLET ORAL DAILY
Qty: 90 TABLET | Refills: 1 | Status: SHIPPED | OUTPATIENT
Start: 2022-07-11

## 2022-07-11 RX ORDER — HYDRALAZINE HYDROCHLORIDE 25 MG/1
25 TABLET, FILM COATED ORAL 2 TIMES DAILY
Qty: 180 TABLET | Refills: 1 | Status: SHIPPED | OUTPATIENT
Start: 2022-07-11

## 2022-07-11 RX ORDER — LEVOTHYROXINE SODIUM 88 UG/1
88 TABLET ORAL DAILY
Qty: 90 TABLET | Refills: 1 | Status: SHIPPED | OUTPATIENT
Start: 2022-07-11

## 2022-07-11 RX ORDER — NYSTATIN 100000 [USP'U]/G
POWDER TOPICAL
Qty: 60 G | Refills: 1 | Status: SHIPPED | OUTPATIENT
Start: 2022-07-11

## 2022-07-11 SDOH — ECONOMIC STABILITY: FOOD INSECURITY: WITHIN THE PAST 12 MONTHS, YOU WORRIED THAT YOUR FOOD WOULD RUN OUT BEFORE YOU GOT MONEY TO BUY MORE.: PATIENT DECLINED

## 2022-07-11 SDOH — ECONOMIC STABILITY: FOOD INSECURITY: WITHIN THE PAST 12 MONTHS, THE FOOD YOU BOUGHT JUST DIDN'T LAST AND YOU DIDN'T HAVE MONEY TO GET MORE.: PATIENT DECLINED

## 2022-07-11 ASSESSMENT — PATIENT HEALTH QUESTIONNAIRE - PHQ9
SUM OF ALL RESPONSES TO PHQ QUESTIONS 1-9: 0
SUM OF ALL RESPONSES TO PHQ9 QUESTIONS 1 & 2: 0
1. LITTLE INTEREST OR PLEASURE IN DOING THINGS: 0
SUM OF ALL RESPONSES TO PHQ QUESTIONS 1-9: 0
2. FEELING DOWN, DEPRESSED OR HOPELESS: 0
SUM OF ALL RESPONSES TO PHQ QUESTIONS 1-9: 0
SUM OF ALL RESPONSES TO PHQ QUESTIONS 1-9: 0

## 2022-07-11 ASSESSMENT — SOCIAL DETERMINANTS OF HEALTH (SDOH): HOW HARD IS IT FOR YOU TO PAY FOR THE VERY BASICS LIKE FOOD, HOUSING, MEDICAL CARE, AND HEATING?: NOT HARD AT ALL

## 2022-07-11 NOTE — PROGRESS NOTES
Genia Schilder  1960 07/11/22  PRESENTS FOR GENERAL PHYSICAL. SUBJECTIVE:    OBESITY, is to have jacky Souza. - for Oct after vacation. She's been maintaining wt. Has underlying PCOS as well but clinically stable    Hypothyroid has been asymptomatic w/o sx of fatigue, constipation, cold intolerance, depression. Wheezing sporadic and no recent flares, on inhaler prn. Allergies also controlled on flonase, singulair. Lipids:  Has history of high cholesterol, not on medication but trying to continue regular low fat diet and maintenance of weight, regular exercise. [facility] ALSO IMPROVED    Allergies   Allergen Reactions    Norvasc [Amlodipine]      Leg swelling     Current Outpatient Medications   Medication Sig Dispense Refill    levothyroxine (SYNTHROID) 88 MCG tablet Take 1 tablet by mouth daily 90 tablet 1    spironolactone (ALDACTONE) 100 MG tablet Take 1 tablet by mouth 2 times daily 180 tablet 1    montelukast (SINGULAIR) 10 MG tablet Take 1 tablet by mouth daily 90 tablet 1    hydrALAZINE (APRESOLINE) 25 MG tablet Take 1 tablet by mouth 2 times daily 180 tablet 1    metFORMIN (GLUCOPHAGE) 500 MG tablet Take 2 tablets by mouth 2 times daily (with meals) 360 tablet 1    albuterol sulfate HFA (PROVENTIL HFA) 108 (90 Base) MCG/ACT inhaler Inhale 2 puffs into the lungs every 6 hours as needed for Wheezing 3 each 1    nystatin (MYCOSTATIN) 043225 UNIT/GM powder Apply 3 times daily. 60 g 1    clotrimazole-betamethasone (LOTRISONE) 1-0.05 % cream Apply topically 2 times daily.  45 g 1    fluticasone (FLONASE) 50 MCG/ACT nasal spray 2 sprays by Nasal route daily 3 Bottle 1    azelastine (ASTELIN) 0.1 % nasal spray 1 spray by Nasal route 2 times daily Use in each nostril as directed 3 Bottle 1    Estriol-Estradiol (BI-EST 50:50) CREA Place onto the skin daily      loratadine (CLARITIN) 10 MG tablet Take 1 tablet by mouth daily as needed (allergies) 30 tablet 5    aspirin 81 MG tablet Take 81 mg by mouth daily.  calcium carbonate (OSCAL) 500 MG TABS tablet Take 500 mg by mouth daily.  ibuprofen (ADVIL) 200 MG CAPS Take 1 capsule by mouth. 2 tablets nightly       No current facility-administered medications for this visit.        Immunization History   Administered Date(s) Administered    COVID-19, PFIZER PURPLE top, DILUTE for use, (age 15 y+), 30mcg/0.3mL 03/04/2021, 03/25/2021, 03/30/2022    Hepatitis A Vaccine 03/10/2005, 09/26/2005    Hepatitis B Adult (Recombivax HB) 04/13/1994, 05/11/1994, 10/19/1994    Influenza Virus Vaccine 09/08/2014, 10/26/2018    Influenza, Intradermal, Preservative free 12/07/2015    Influenza, Rejeana Fort, IM, (6 mo and older Fluzone, Flulaval, Fluarix and 3 yrs and older Afluria) 10/31/2016    Influenza, Rejeana Fort, IM, PF (6 mo and older Fluzone, Flulaval, Fluarix, and 3 yrs and older Afluria) 10/25/2017, 11/20/2019    MMR 05/11/1994    Meningococcal MCV4P (Menactra) 10/29/2008    Polio IPV (IPOL) 10/29/2008    Td vaccine (adult) 03/10/2005    Tdap (Boostrix, Adacel) 10/29/2008, 07/11/2022    Typhoid Vi capsular polysaccharide (Typhim VI) 10/29/2008    Yellow Fever (YF-Vax) 10/29/2008       Patient Active Problem List   Diagnosis    Fibromyalgia    Hypertension    PCOS (polycystic ovarian syndrome)    Family history of colon cancer    Allergic rhinitis    Former smoker    Fatigue    Family history of coronary artery disease    SOBOE (shortness of breath on exertion)    Multiple thyroid nodules    H/O cardiovascular stress test    HARSHA (obstructive sleep apnea)    Mixed hyperlipidemia    GERD (gastroesophageal reflux disease)    Hypothyroidism due to acquired atrophy of thyroid    Non morbid obesity due to excess calories    S/P total hysterectomy and bilateral salpingo-oophorectomy    Reactive airway disease       Past Medical History:   Diagnosis Date    Allergic rhinitis     Basal cell carcinoma 2011; 2014 right side face; left shoulder blade.  Family history of colon cancer     mother dx'd at 46    Family history of coronary artery disease     Fatigue     Fibromyalgia     on zoloft, also for mood    Former smoker     GERD (gastroesophageal reflux disease)     H/O cardiovascular stress test 3/12/2014    treadmill    H/O cardiovascular stress test 3/12/2015    treadmill    Hypertension     Hypothyroidism due to acquired atrophy of thyroid     Multiple thyroid nodules 03/2015    bx benign, eval 12/2016 per ENT Dr Hilary Olmedo and for f/u 2 yrs- recheck u/s    HARSHA (obstructive sleep apnea)     Dr Ni Pi cpap    PCOS (polycystic ovarian syndrome)     on B vit supplement otc    Reactive airway disease     PFTs done 7/23/20- w minimal obstruction, no response to bronchodilator. has fhx of asthma, is a nonsmoker.     S/P colonoscopy 10/22/14    Dr Alvaro Kunz- recheck 5yrs d/t fhx    S/P total hysterectomy and bilateral salpingo-oophorectomy     AT 32 YO    SOBOE (shortness of breath on exertion)      Past Surgical History:   Procedure Laterality Date    COLONOSCOPY      COLONOSCOPY  01/28/2020    int hem, otherwise normal repeat in 5 years    COLONOSCOPY N/A 1/28/2020    COLONOSCOPY DIAGNOSTIC performed by Lazarus Divers, MD at [facility] ([address] [place] [address])  1991    Total    SKIN BIOPSY      TONSILLECTOMY  1968     Family History   Problem Relation Age of Onset    Diabetes Father     High Blood Pressure Father     Stroke Father     Drug Abuse Father     Alcohol Abuse Father     Alcohol Abuse Sister     Drug Abuse Sister     Cirrhosis Sister     Other Sister         Hep C    Obesity Sister     Kidney Disease Mother         Kidney failure    Heart Failure Mother         CHF    Cancer Mother         colon CA at 46    High Blood Pressure Mother     Stroke Mother     Obesity Mother     Depression Mother     Drug Abuse Brother     Alcohol Abuse Brother      Social History     Socioeconomic History    Marital status:      Spouse name: Not on file    Number of children: Not on file    Years of education: Not on file    Highest education level: Not on file   Occupational History    Occupation: Teacher     Comment: Education C/ [employer] 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   Tobacco Use    Smoking status: Former Smoker     Packs/day: 1.00     Years: 25.00     Pack years: 25.00     Quit date:      Years since quittin.5    Smokeless tobacco: Never Used   Vaping Use    Vaping Use: Never used   Substance and Sexual Activity    Alcohol use: Yes     Comment: Very rare    Drug use: Never    Sexual activity: Not on file   Other Topics Concern    Not on file   Social History Narrative    Not on file     Social Determinants of Health     Financial Resource Strain: Low Risk     Difficulty of Paying Living Expenses: Not hard at all   Food Insecurity: Unknown    Worried About [address] in the Last Year: Patient refused    [address] in the Last Year: Patient refused   Transportation Needs:     Lack of Transportation (Medical): Not on file    Lack of Transportation (Non-Medical):  Not on file   Physical Activity:     Days of Exercise per Week: Not on file    Minutes of Exercise per Session: Not on file   Stress:     Feeling of Stress : Not on file   Social Connections:     Frequency of Communication with Friends and Family: Not on file    Frequency of Social Gatherings with Friends and Family: Not on file    Attends Christian Services: Not on file    Active Member of Clubs or Organizations: Not on file    Attends Club or Organization Meetings: Not on file    Marital Status: Not on file   Intimate Partner Violence:     Fear of Current or Ex-Partner: Not on file    Emotionally Abused: Not on file    Physically Abused: Not on file    Sexually Abused: Not on file   Housing Stability:     Unable to Pay for Housing in the Last Year: Not on file    Number of Places Lived in the Last Year: Not on file    Unstable Housing in the Last Year: Not on file         OBJECTIVE:    /74   Pulse 69   Resp 14   Wt 166 lb (75.3 kg)   SpO2 98%   BMI 30.36 kg/m²     Physical Exam  Vitals reviewed. Constitutional:       Appearance: She is well-developed. HENT:      Head: Normocephalic and atraumatic. Eyes:      General: No scleral icterus. Right eye: No discharge. Left eye: No discharge. Conjunctiva/sclera: Conjunctivae normal.      Pupils: Pupils are equal, round, and reactive to light. Neck:      Thyroid: No thyromegaly. Vascular: No JVD. Trachea: No tracheal deviation. Cardiovascular:      Rate and Rhythm: Normal rate and regular rhythm. Heart sounds: Normal heart sounds. No murmur heard. No friction rub. No gallop. Pulmonary:      Effort: Pulmonary effort is normal. No respiratory distress. Breath sounds: Normal breath sounds. No wheezing or rales. Abdominal:      General: Bowel sounds are normal. There is no distension. Palpations: Abdomen is soft. There is no mass. Tenderness: There is no abdominal tenderness. There is no guarding or rebound. Hernia: No hernia is present. Musculoskeletal:      Cervical back: Neck supple. Lymphadenopathy:      Cervical: No cervical adenopathy. Skin:     General: Skin is warm and dry. Neurological:      Mental Status: She is alert. Psychiatric:         Mood and Affect: Mood normal.         ASSESSMENT:    1. Routine general medical examination at a health care facility    2. Essential hypertension    3. Hypothyroidism due to acquired atrophy of thyroid    4. PCOS (polycystic ovarian syndrome)    5. Wheezing    6. Non-seasonal allergic rhinitis due to other allergic trigger    7. Candida infection of flexural skin    8. Need for diphtheria-tetanus-pertussis (Tdap) vaccine    9.  Mixed hyperlipidemia PLAN:    Chloe Simmons was seen today for hypertension. Diagnoses and all orders for this visit:    Routine general medical examination at a health care facility - Overall general medical exam appears benign, other assessments as noted and testing is as noted below. Essential hypertension  - Blood pressure stable and will continue current regimen. Will plan periodic monitoring of renal function, electrolytes, lipid profile. -     hydrALAZINE (APRESOLINE) 25 MG tablet; Take 1 tablet by mouth 2 times daily  -     Comprehensive Metabolic Panel; Future  -     CBC with Auto Differential; Future  -     Lipid Panel; Future    Hypothyroidism due to acquired atrophy of thyroid  - Clinically hypothyroidism appears stable and will continue current dosing, also periodic monitoring of TFTs. -     levothyroxine (SYNTHROID) 88 MCG tablet; Take 1 tablet by mouth daily  -     TSH; Future  -     T4, Free; Future    PCOS (polycystic ovarian syndrome)- stable on meds and maintaining wt. Cont med regimen  -     spironolactone (ALDACTONE) 100 MG tablet; Take 1 tablet by mouth 2 times daily  -     metFORMIN (GLUCOPHAGE) 500 MG tablet; Take 2 tablets by mouth 2 times daily (with meals)    Wheezing  -   clinically stable w/o ongoing symptoms, will continue inhaler/medical regimen. -     montelukast (SINGULAIR) 10 MG tablet; Take 1 tablet by mouth daily  -     albuterol sulfate HFA (PROVENTIL HFA) 108 (90 Base) MCG/ACT inhaler; Inhale 2 puffs into the lungs every 6 hours as needed for Wheezing    Non-seasonal allergic rhinitis due to other allergic trigger- Allergies controlled on current medical regimen which will be continued. -     montelukast (SINGULAIR) 10 MG tablet; Take 1 tablet by mouth daily    Candida infection of flexural skin- periodic and responding to prn powder  -     nystatin (MYCOSTATIN) 145705 UNIT/GM powder; Apply 3 times daily.     Need for diphtheria-tetanus-pertussis (Tdap) vaccine- update due  - Jessica, BOOSTRIX, (age 8 yrs+), IM    Mixed hyperlipidemia - Pt will continue to work on a low fat diet and also exercise, wt loss as appropriate. Will continue periodic monitoring of fasting lipid profile, glucose, liver function. -     Comprehensive Metabolic Panel; Future  -     Lipid Panel;  Future

## 2022-09-28 DIAGNOSIS — J30.89 NON-SEASONAL ALLERGIC RHINITIS DUE TO OTHER ALLERGIC TRIGGER: ICD-10-CM

## 2022-09-28 RX ORDER — AZELASTINE HYDROCHLORIDE 137 UG/1
SPRAY, METERED NASAL
Qty: 90 ML | Refills: 3 | Status: SHIPPED | OUTPATIENT
Start: 2022-09-28

## 2022-09-29 DIAGNOSIS — J30.89 NON-SEASONAL ALLERGIC RHINITIS DUE TO OTHER ALLERGIC TRIGGER: ICD-10-CM

## 2022-09-30 RX ORDER — AZELASTINE HYDROCHLORIDE 137 UG/1
SPRAY, METERED NASAL
Qty: 90 ML | Refills: 3 | OUTPATIENT
Start: 2022-09-30

## 2023-01-06 LAB
ABSOLUTE IMMATURE GRANULOCYTE: 0 K/UL (ref 0–0.1)
ALBUMIN/GLOBULIN RATIO: 2.3 RATIO (ref 0.8–2.6)
ALBUMIN: 4.5 G/DL (ref 3.5–5.2)
ALP BLD-CCNC: 90 U/L (ref 23–144)
ALT SERPL-CCNC: 19 U/L (ref 0–60)
AST SERPL-CCNC: 17 U/L (ref 0–55)
BASOPHILS ABSOLUTE: 0.1 K/UL (ref 0–0.3)
BASOPHILS RELATIVE PERCENT: 1.8 % (ref 0–2)
BILIRUB SERPL-MCNC: 0.3 MG/DL (ref 0–1.2)
BUN BLDV-MCNC: 12 MG/DL (ref 3–29)
BUN/CREAT BLD: 15 (ref 7–25)
CALCIUM SERPL-MCNC: 9.8 MG/DL (ref 8.5–10.5)
CHLORIDE BLD-SCNC: 103 MEQ/L (ref 96–110)
CHOLESTEROL: 197 MG/DL
CO2: 29 MEQ/L (ref 19–32)
CREAT SERPL-MCNC: 0.8 MG/DL (ref 0.5–1.2)
DIFFERENTIAL TYPE: NORMAL
EOSINOPHILS ABSOLUTE: 0.4 K/UL (ref 0–0.5)
EOSINOPHILS RELATIVE PERCENT: 5 % (ref 0–5)
GLOBULIN: 2 G/DL (ref 1.9–3.6)
GLOMERULAR FILTRATION RATE: 82 MLS/MIN/1.73M2
GLUCOSE BLD-MCNC: 96 MG/DL (ref 70–99)
HCT VFR BLD CALC: 41.7 % (ref 34–49)
HDLC SERPL-MCNC: 76 MG/DL
HEMOGLOBIN: 13.7 G/DL (ref 11.2–15.7)
IMMATURE GRANULOCYTES: 0.3 %
LDL CHOLESTEROL CALCULATED: 104 MG/DL
LYMPHOCYTES ABSOLUTE: 2.2 K/UL (ref 0.9–4.1)
LYMPHOCYTES RELATIVE PERCENT: 28.7 % (ref 14–51)
MCH RBC QN AUTO: 29.6 PG (ref 26–34)
MCHC RBC AUTO-ENTMCNC: 32.9 G/DL (ref 30.7–35.5)
MCV RBC AUTO: 90.1 FL (ref 80–100)
MONOCYTES ABSOLUTE: 0.9 K/UL (ref 0.2–1)
MONOCYTES RELATIVE PERCENT: 11.4 % (ref 4–12)
NEUTROPHILS ABSOLUTE: 4.1 K/UL (ref 1.8–7.5)
NEUTROPHILS RELATIVE PERCENT: 52.8 % (ref 42–80)
NUCLEATED RBCS: 0 /100 WBC
PDW BLD-RTO: 12.2 %
PLATELET # BLD: 374 K/UL (ref 140–400)
PMV BLD AUTO: 10.3 FL (ref 7.2–11.7)
POTASSIUM SERPL-SCNC: 4.3 MEQ/L (ref 3.4–5.3)
RBC # BLD: 4.63 M/UL (ref 3.95–5.26)
SODIUM BLD-SCNC: 143 MEQ/L (ref 135–148)
STATUS: NORMAL
T4 FREE: 1.59 NG/DL (ref 0.8–1.8)
TOTAL PROTEIN: 6.5 G/DL (ref 6–8.3)
TRIGL SERPL-MCNC: 85 MG/DL
TSH SERPL DL<=0.05 MIU/L-ACNC: 2 MCIU/ML (ref 0.4–4.5)
VLDLC SERPL CALC-MCNC: 17 MG/DL (ref 4–38)
WBC: 7.8 K/UL (ref 3.5–10.9)

## 2023-01-11 ENCOUNTER — OFFICE VISIT (OUTPATIENT)
Dept: INTERNAL MEDICINE CLINIC | Age: 63
End: 2023-01-11
Payer: COMMERCIAL

## 2023-01-11 VITALS
SYSTOLIC BLOOD PRESSURE: 136 MMHG | HEIGHT: 62 IN | RESPIRATION RATE: 14 BRPM | OXYGEN SATURATION: 98 % | DIASTOLIC BLOOD PRESSURE: 78 MMHG | WEIGHT: 164 LBS | HEART RATE: 61 BPM | BODY MASS INDEX: 30.18 KG/M2

## 2023-01-11 DIAGNOSIS — R06.2 WHEEZING: ICD-10-CM

## 2023-01-11 DIAGNOSIS — J30.89 NON-SEASONAL ALLERGIC RHINITIS DUE TO OTHER ALLERGIC TRIGGER: ICD-10-CM

## 2023-01-11 DIAGNOSIS — I10 ESSENTIAL HYPERTENSION: Primary | ICD-10-CM

## 2023-01-11 DIAGNOSIS — E03.4 HYPOTHYROIDISM DUE TO ACQUIRED ATROPHY OF THYROID: ICD-10-CM

## 2023-01-11 DIAGNOSIS — E28.2 PCOS (POLYCYSTIC OVARIAN SYNDROME): ICD-10-CM

## 2023-01-11 DIAGNOSIS — E78.2 MIXED HYPERLIPIDEMIA: ICD-10-CM

## 2023-01-11 PROCEDURE — 99214 OFFICE O/P EST MOD 30 MIN: CPT | Performed by: INTERNAL MEDICINE

## 2023-01-11 PROCEDURE — 3078F DIAST BP <80 MM HG: CPT | Performed by: INTERNAL MEDICINE

## 2023-01-11 PROCEDURE — 3075F SYST BP GE 130 - 139MM HG: CPT | Performed by: INTERNAL MEDICINE

## 2023-01-11 RX ORDER — SPIRONOLACTONE 100 MG/1
100 TABLET, FILM COATED ORAL 2 TIMES DAILY
Qty: 180 TABLET | Refills: 1 | Status: SHIPPED | OUTPATIENT
Start: 2023-01-11

## 2023-01-11 RX ORDER — LEVOTHYROXINE SODIUM 88 UG/1
88 TABLET ORAL DAILY
Qty: 90 TABLET | Refills: 1 | Status: SHIPPED | OUTPATIENT
Start: 2023-01-11

## 2023-01-11 RX ORDER — MONTELUKAST SODIUM 10 MG/1
10 TABLET ORAL DAILY
Qty: 90 TABLET | Refills: 1 | Status: SHIPPED | OUTPATIENT
Start: 2023-01-11

## 2023-01-11 RX ORDER — ALBUTEROL SULFATE 90 UG/1
2 AEROSOL, METERED RESPIRATORY (INHALATION) EVERY 6 HOURS PRN
Qty: 3 EACH | Refills: 1 | Status: SHIPPED | OUTPATIENT
Start: 2023-01-11

## 2023-01-11 RX ORDER — AZELASTINE HYDROCHLORIDE 137 UG/1
1 SPRAY, METERED NASAL 2 TIMES DAILY PRN
Qty: 90 ML | Refills: 1 | Status: SHIPPED | OUTPATIENT
Start: 2023-01-11

## 2023-01-11 RX ORDER — FLUTICASONE PROPIONATE 50 MCG
2 SPRAY, SUSPENSION (ML) NASAL DAILY
Qty: 3 EACH | Refills: 1 | Status: SHIPPED | OUTPATIENT
Start: 2023-01-11

## 2023-01-11 RX ORDER — HYDRALAZINE HYDROCHLORIDE 25 MG/1
25 TABLET, FILM COATED ORAL 2 TIMES DAILY
Qty: 180 TABLET | Refills: 1 | Status: SHIPPED | OUTPATIENT
Start: 2023-01-11

## 2023-01-11 ASSESSMENT — PATIENT HEALTH QUESTIONNAIRE - PHQ9
2. FEELING DOWN, DEPRESSED OR HOPELESS: 0
1. LITTLE INTEREST OR PLEASURE IN DOING THINGS: 0
SUM OF ALL RESPONSES TO PHQ QUESTIONS 1-9: 0
SUM OF ALL RESPONSES TO PHQ9 QUESTIONS 1 & 2: 0
SUM OF ALL RESPONSES TO PHQ QUESTIONS 1-9: 0

## 2023-01-11 NOTE — PROGRESS NOTES
[facility]  1960 01/11/23    SUBJECTIVE:  did have COVID infection last week but only cold sx, then resolved. Hx of wheezing but no flares, keeps alb on hand. Hypothyroid has been asymptomatic w/o sx of fatigue, constipation, cold intolerance, depression. PCOS, wt maintained and also had abdominoplasty at [address] [place] [address] in Oct, now well healed. Remains on metformin    Hypertension: Stable. Denies CP, SOB, cough, visual changes, dizziness, palpitations or HA.  B       OBJECTIVE:    /78   Pulse 61   Resp 14   Ht 5' 2\" (1.575 m)   Wt 164 lb (74.4 kg)   SpO2 98%   BMI 30.00 kg/m²     Physical Exam  Vitals reviewed. Constitutional:       Appearance: She is well-developed. HENT:      Head: Normocephalic and atraumatic. Nose: Nose normal.      Mouth/Throat:      Mouth: Mucous membranes are moist.      Pharynx: Oropharynx is clear. No oropharyngeal exudate. Eyes:      General: No scleral icterus. Right eye: No discharge. Left eye: No discharge. Conjunctiva/sclera: Conjunctivae normal.      Pupils: Pupils are equal, round, and reactive to light. Neck:      Thyroid: No thyromegaly. Vascular: No JVD. Trachea: No tracheal deviation. Cardiovascular:      Rate and Rhythm: Normal rate and regular rhythm. Heart sounds: Normal heart sounds. No murmur heard. No friction rub. No gallop. Pulmonary:      Effort: Pulmonary effort is normal. No respiratory distress. Breath sounds: Normal breath sounds. No wheezing or rales. Abdominal:      General: Bowel sounds are normal. There is no distension. Palpations: Abdomen is soft. There is no mass. Tenderness: There is no abdominal tenderness. There is no guarding or rebound. Hernia: No hernia is present. Comments: Incision sites s/p abdominoplasty intact, well healed. Musculoskeletal:      Cervical back: Neck supple. Lymphadenopathy:      Cervical: No cervical adenopathy.    Skin: General: Skin is warm and dry. Neurological:      Mental Status: She is alert. Psychiatric:         Mood and Affect: Mood normal.       ASSESSMENT:    1. Essential hypertension    2. Hypothyroidism due to acquired atrophy of thyroid    3. PCOS (polycystic ovarian syndrome)    4. Non-seasonal allergic rhinitis due to other allergic trigger    5. Wheezing    6. Mixed hyperlipidemia        PLAN:    Diagnoses and all orders for this visit:    Essential hypertension - Blood pressure stable and will continue current regimen. Will plan periodic monitoring of renal function, electrolytes, lipid profile. -     hydrALAZINE (APRESOLINE) 25 MG tablet; Take 1 tablet by mouth 2 times daily  -     Comprehensive Metabolic Panel; Future  -     CBC with Auto Differential; Future  -     Lipid Panel; Future    Hypothyroidism due to acquired atrophy of thyroid  - Clinically hypothyroidism appears stable and will continue current dosing, also periodic monitoring of TFTs. -     levothyroxine (SYNTHROID) 88 MCG tablet; Take 1 tablet by mouth daily  -     TSH; Future  -     T4, Free; Future    PCOS (polycystic ovarian syndrome) - cont meds and monitor, wt stable- after now healed from abdominoplasty to resume REFIT exercises too  -     spironolactone (ALDACTONE) 100 MG tablet; Take 1 tablet by mouth 2 times daily  -     metFORMIN (GLUCOPHAGE) 500 MG tablet; Take 2 tablets by mouth 2 times daily (with meals)    Non-seasonal allergic rhinitis due to other allergic trigger - Allergies controlled on current medical regimen which will be continued. -     fluticasone (FLONASE) 50 MCG/ACT nasal spray; 2 sprays by Nasal route daily  -     Azelastine HCl 137 MCG/SPRAY SOLN; 1 spray by Nasal route 2 times daily as needed (ALLERGIES)  -     montelukast (SINGULAIR) 10 MG tablet; Take 1 tablet by mouth daily    Wheezing- The current medical regimen is effective;  continue present plan and medications.     -     montelukast (SINGULAIR) 10 MG tablet; Take 1 tablet by mouth daily  -     albuterol sulfate HFA (PROVENTIL HFA) 108 (90 Base) MCG/ACT inhaler; Inhale 2 puffs into the lungs every 6 hours as needed for Wheezing    Mixed hyperlipidemia  - Pt will continue to work on a low fat diet and also exercise, wt loss as appropriate. Will continue periodic monitoring of fasting lipid profile, glucose, liver function. -     Comprehensive Metabolic Panel; Future  -     CBC with Auto Differential; Future  -     Lipid Panel;  Future

## 2023-06-28 DIAGNOSIS — J30.89 NON-SEASONAL ALLERGIC RHINITIS DUE TO OTHER ALLERGIC TRIGGER: ICD-10-CM

## 2023-06-28 DIAGNOSIS — E03.4 HYPOTHYROIDISM DUE TO ACQUIRED ATROPHY OF THYROID: ICD-10-CM

## 2023-06-28 DIAGNOSIS — E28.2 PCOS (POLYCYSTIC OVARIAN SYNDROME): ICD-10-CM

## 2023-06-28 DIAGNOSIS — R06.2 WHEEZING: ICD-10-CM

## 2023-06-28 DIAGNOSIS — I10 ESSENTIAL HYPERTENSION: ICD-10-CM

## 2023-06-28 RX ORDER — LEVOTHYROXINE SODIUM 88 UG/1
TABLET ORAL
Qty: 90 TABLET | Refills: 3 | OUTPATIENT
Start: 2023-06-28

## 2023-06-28 RX ORDER — HYDRALAZINE HYDROCHLORIDE 25 MG/1
TABLET, FILM COATED ORAL
Qty: 180 TABLET | Refills: 3 | OUTPATIENT
Start: 2023-06-28

## 2023-06-28 RX ORDER — SPIRONOLACTONE 100 MG/1
TABLET, FILM COATED ORAL
Qty: 180 TABLET | Refills: 3 | OUTPATIENT
Start: 2023-06-28

## 2023-06-28 RX ORDER — FLUTICASONE PROPIONATE 50 MCG
SPRAY, SUSPENSION (ML) NASAL
Qty: 48 G | Refills: 3 | OUTPATIENT
Start: 2023-06-28

## 2023-06-28 RX ORDER — MONTELUKAST SODIUM 10 MG/1
TABLET ORAL
Qty: 90 TABLET | Refills: 3 | OUTPATIENT
Start: 2023-06-28

## 2023-06-29 LAB
ABSOLUTE IMMATURE GRANULOCYTE: 0 K/UL (ref 0–0.1)
ALBUMIN/GLOBULIN RATIO: 2.4 RATIO (ref 0.8–2.6)
ALBUMIN: 4.3 G/DL (ref 3.5–5.2)
ALP BLD-CCNC: 84 U/L (ref 23–144)
ALT SERPL-CCNC: 23 U/L (ref 0–60)
AST SERPL-CCNC: 18 U/L (ref 0–55)
BASOPHILS ABSOLUTE: 0.1 K/UL (ref 0–0.3)
BASOPHILS RELATIVE PERCENT: 1.2 % (ref 0–2)
BILIRUB SERPL-MCNC: 0.3 MG/DL (ref 0–1.2)
BUN BLDV-MCNC: 13 MG/DL (ref 3–29)
BUN/CREAT BLD: 16 (ref 7–25)
CALCIUM SERPL-MCNC: 9.8 MG/DL (ref 8.5–10.5)
CHLORIDE BLD-SCNC: 104 MEQ/L (ref 96–110)
CHOLESTEROL: 208 MG/DL
CO2: 27 MEQ/L (ref 19–32)
CREAT SERPL-MCNC: 0.8 MG/DL (ref 0.5–1.2)
DIFFERENTIAL TYPE: NORMAL
EOSINOPHILS ABSOLUTE: 0.3 K/UL (ref 0–0.5)
EOSINOPHILS RELATIVE PERCENT: 3.8 % (ref 0–5)
GLOBULIN: 1.8 G/DL (ref 1.9–3.6)
GLOMERULAR FILTRATION RATE: 82 MLS/MIN/1.73M2
GLUCOSE BLD-MCNC: 98 MG/DL (ref 70–99)
HCT VFR BLD CALC: 41.1 % (ref 34–49)
HDLC SERPL-MCNC: 63 MG/DL
HEMOGLOBIN: 13.4 G/DL (ref 11.2–15.7)
IMMATURE GRANULOCYTES: 0.3 %
LDL CHOLESTEROL CALCULATED: 119 MG/DL
LYMPHOCYTES ABSOLUTE: 3.2 K/UL (ref 0.9–4.1)
LYMPHOCYTES RELATIVE PERCENT: 35.9 % (ref 14–51)
MCH RBC QN AUTO: 29.3 PG (ref 26–34)
MCHC RBC AUTO-ENTMCNC: 32.6 G/DL (ref 30.7–35.5)
MCV RBC AUTO: 89.9 FL (ref 80–100)
MONOCYTES ABSOLUTE: 0.6 K/UL (ref 0.2–1)
MONOCYTES RELATIVE PERCENT: 7.1 % (ref 4–12)
NEUTROPHILS ABSOLUTE: 4.7 K/UL (ref 1.8–7.5)
NEUTROPHILS RELATIVE PERCENT: 51.7 % (ref 42–80)
NUCLEATED RBCS: 0 /100 WBC
PDW BLD-RTO: 12.3 %
PLATELET # BLD: 375 K/UL (ref 140–400)
PMV BLD AUTO: 10.8 FL (ref 7.2–11.7)
POTASSIUM SERPL-SCNC: 4.8 MEQ/L (ref 3.4–5.3)
RBC # BLD: 4.57 M/UL (ref 3.95–5.26)
SODIUM BLD-SCNC: 142 MEQ/L (ref 135–148)
STATUS: ABNORMAL
T4 FREE: 1.66 NG/DL (ref 0.8–1.8)
TOTAL PROTEIN: 6.1 G/DL (ref 6–8.3)
TRIGL SERPL-MCNC: 128 MG/DL
TSH SERPL DL<=0.05 MIU/L-ACNC: 0.91 MCIU/ML (ref 0.4–4.5)
VLDLC SERPL CALC-MCNC: 26 MG/DL (ref 4–38)
WBC: 9 K/UL (ref 3.5–10.9)

## 2023-07-09 SDOH — ECONOMIC STABILITY: FOOD INSECURITY: WITHIN THE PAST 12 MONTHS, YOU WORRIED THAT YOUR FOOD WOULD RUN OUT BEFORE YOU GOT MONEY TO BUY MORE.: NEVER TRUE

## 2023-07-09 SDOH — ECONOMIC STABILITY: TRANSPORTATION INSECURITY
IN THE PAST 12 MONTHS, HAS LACK OF TRANSPORTATION KEPT YOU FROM MEETINGS, WORK, OR FROM GETTING THINGS NEEDED FOR DAILY LIVING?: NO

## 2023-07-09 SDOH — ECONOMIC STABILITY: INCOME INSECURITY: HOW HARD IS IT FOR YOU TO PAY FOR THE VERY BASICS LIKE FOOD, HOUSING, MEDICAL CARE, AND HEATING?: NOT HARD AT ALL

## 2023-07-09 SDOH — ECONOMIC STABILITY: HOUSING INSECURITY
IN THE LAST 12 MONTHS, WAS THERE A TIME WHEN YOU DID NOT HAVE A STEADY PLACE TO SLEEP OR SLEPT IN A SHELTER (INCLUDING NOW)?: NO

## 2023-07-09 SDOH — ECONOMIC STABILITY: FOOD INSECURITY: WITHIN THE PAST 12 MONTHS, THE FOOD YOU BOUGHT JUST DIDN'T LAST AND YOU DIDN'T HAVE MONEY TO GET MORE.: NEVER TRUE

## 2023-07-12 ENCOUNTER — OFFICE VISIT (OUTPATIENT)
Dept: INTERNAL MEDICINE CLINIC | Age: 63
End: 2023-07-12
Payer: MEDICARE

## 2023-07-12 VITALS
HEIGHT: 62 IN | DIASTOLIC BLOOD PRESSURE: 72 MMHG | WEIGHT: 165 LBS | OXYGEN SATURATION: 98 % | SYSTOLIC BLOOD PRESSURE: 124 MMHG | HEART RATE: 62 BPM | RESPIRATION RATE: 14 BRPM | BODY MASS INDEX: 30.36 KG/M2

## 2023-07-12 DIAGNOSIS — E78.2 MIXED HYPERLIPIDEMIA: ICD-10-CM

## 2023-07-12 DIAGNOSIS — Z00.00 ROUTINE GENERAL MEDICAL EXAMINATION AT A HEALTH CARE FACILITY: Primary | ICD-10-CM

## 2023-07-12 DIAGNOSIS — E03.4 HYPOTHYROIDISM DUE TO ACQUIRED ATROPHY OF THYROID: ICD-10-CM

## 2023-07-12 DIAGNOSIS — I10 ESSENTIAL HYPERTENSION: ICD-10-CM

## 2023-07-12 DIAGNOSIS — Z23 NEED FOR PROPHYLACTIC VACCINATION AGAINST STREPTOCOCCUS PNEUMONIAE (PNEUMOCOCCUS): ICD-10-CM

## 2023-07-12 DIAGNOSIS — R06.2 WHEEZING: ICD-10-CM

## 2023-07-12 DIAGNOSIS — J30.89 NON-SEASONAL ALLERGIC RHINITIS DUE TO OTHER ALLERGIC TRIGGER: ICD-10-CM

## 2023-07-12 DIAGNOSIS — Z00.00 WELCOME TO MEDICARE PREVENTIVE VISIT: ICD-10-CM

## 2023-07-12 DIAGNOSIS — E28.2 PCOS (POLYCYSTIC OVARIAN SYNDROME): ICD-10-CM

## 2023-07-12 PROCEDURE — 90677 PCV20 VACCINE IM: CPT | Performed by: INTERNAL MEDICINE

## 2023-07-12 PROCEDURE — 1123F ACP DISCUSS/DSCN MKR DOCD: CPT | Performed by: INTERNAL MEDICINE

## 2023-07-12 PROCEDURE — 3074F SYST BP LT 130 MM HG: CPT | Performed by: INTERNAL MEDICINE

## 2023-07-12 PROCEDURE — 3078F DIAST BP <80 MM HG: CPT | Performed by: INTERNAL MEDICINE

## 2023-07-12 PROCEDURE — G0438 PPPS, INITIAL VISIT: HCPCS | Performed by: INTERNAL MEDICINE

## 2023-07-12 PROCEDURE — G0009 ADMIN PNEUMOCOCCAL VACCINE: HCPCS | Performed by: INTERNAL MEDICINE

## 2023-07-12 RX ORDER — MONTELUKAST SODIUM 10 MG/1
10 TABLET ORAL DAILY
Qty: 90 TABLET | Refills: 1 | Status: SHIPPED | OUTPATIENT
Start: 2023-07-12

## 2023-07-12 RX ORDER — HYDRALAZINE HYDROCHLORIDE 25 MG/1
25 TABLET, FILM COATED ORAL 2 TIMES DAILY
Qty: 180 TABLET | Refills: 1 | Status: SHIPPED | OUTPATIENT
Start: 2023-07-12

## 2023-07-12 RX ORDER — LEVOTHYROXINE SODIUM 88 UG/1
88 TABLET ORAL DAILY
Qty: 90 TABLET | Refills: 1 | Status: SHIPPED | OUTPATIENT
Start: 2023-07-12

## 2023-07-12 RX ORDER — AZELASTINE HYDROCHLORIDE 137 UG/1
1 SPRAY, METERED NASAL 2 TIMES DAILY PRN
Qty: 90 ML | Refills: 1 | Status: SHIPPED | OUTPATIENT
Start: 2023-07-12

## 2023-07-12 RX ORDER — FLUTICASONE PROPIONATE 50 MCG
2 SPRAY, SUSPENSION (ML) NASAL DAILY
Qty: 3 EACH | Refills: 1 | Status: SHIPPED | OUTPATIENT
Start: 2023-07-12

## 2023-07-12 RX ORDER — SPIRONOLACTONE 100 MG/1
100 TABLET, FILM COATED ORAL 2 TIMES DAILY
Qty: 180 TABLET | Refills: 1 | Status: SHIPPED | OUTPATIENT
Start: 2023-07-12

## 2023-07-12 ASSESSMENT — LIFESTYLE VARIABLES
HOW MANY STANDARD DRINKS CONTAINING ALCOHOL DO YOU HAVE ON A TYPICAL DAY: PATIENT DOES NOT DRINK
HOW OFTEN DO YOU HAVE A DRINK CONTAINING ALCOHOL: NEVER

## 2023-07-12 ASSESSMENT — PATIENT HEALTH QUESTIONNAIRE - PHQ9
1. LITTLE INTEREST OR PLEASURE IN DOING THINGS: 0
2. FEELING DOWN, DEPRESSED OR HOPELESS: 0
SUM OF ALL RESPONSES TO PHQ QUESTIONS 1-9: 0
SUM OF ALL RESPONSES TO PHQ QUESTIONS 1-9: 0
SUM OF ALL RESPONSES TO PHQ9 QUESTIONS 1 & 2: 0
SUM OF ALL RESPONSES TO PHQ QUESTIONS 1-9: 0
SUM OF ALL RESPONSES TO PHQ QUESTIONS 1-9: 0

## 2024-01-05 LAB
ALBUMIN SERPL-MCNC: 4.5 G/DL
ALT SERPL-CCNC: 27 U/L
ANION GAP SERPL CALCULATED.3IONS-SCNC: 2.3 MMOL/L
AST SERPL-CCNC: 23 U/L
BASOPHILS ABSOLUTE: NORMAL
BASOPHILS RELATIVE PERCENT: NORMAL
BILIRUB SERPL-MCNC: 0.6 MG/DL (ref 0.1–1.4)
BUN BLDV-MCNC: 22 MG/DL
CALCIUM SERPL-MCNC: 10.1 MG/DL
CHLORIDE BLD-SCNC: 103 MMOL/L
CHOLESTEROL, TOTAL: 221 MG/DL
CHOLESTEROL/HDL RATIO: ABNORMAL
CO2: 25 MMOL/L
CREAT SERPL-MCNC: 0.9 MG/DL
EGFR: 71
EOSINOPHILS ABSOLUTE: NORMAL
EOSINOPHILS RELATIVE PERCENT: NORMAL
GLUCOSE BLD-MCNC: 88 MG/DL
HCT VFR BLD CALC: 41.1 % (ref 36–46)
HDLC SERPL-MCNC: 73 MG/DL (ref 35–70)
HEMOGLOBIN: 13.5 G/DL (ref 12–16)
LDL CHOLESTEROL CALCULATED: 130 MG/DL (ref 0–160)
LYMPHOCYTES ABSOLUTE: NORMAL
LYMPHOCYTES RELATIVE PERCENT: NORMAL
MCH RBC QN AUTO: 29.3 PG
MCHC RBC AUTO-ENTMCNC: 32.8 G/DL
MCV RBC AUTO: 89.3 FL
MONOCYTES ABSOLUTE: NORMAL
MONOCYTES RELATIVE PERCENT: NORMAL
NEUTROPHILS ABSOLUTE: NORMAL
NEUTROPHILS RELATIVE PERCENT: NORMAL
NONHDLC SERPL-MCNC: ABNORMAL MG/DL
PDW BLD-RTO: 12.1 %
PLATELET # BLD: 383 K/ΜL
PMV BLD AUTO: 10.9 FL
POTASSIUM SERPL-SCNC: 4.3 MMOL/L
RBC # BLD: 4.6 10^6/ΜL
SODIUM BLD-SCNC: 140 MMOL/L
T4 FREE: 1.64
TOTAL PROTEIN: 6.5
TRIGL SERPL-MCNC: 91 MG/DL
TSH SERPL DL<=0.05 MIU/L-ACNC: 0.93 UIU/ML
VLDLC SERPL CALC-MCNC: 18 MG/DL
WBC # BLD: 9.9 10^3/ML

## 2024-01-08 DIAGNOSIS — R06.2 WHEEZING: ICD-10-CM

## 2024-01-08 DIAGNOSIS — J30.89 NON-SEASONAL ALLERGIC RHINITIS DUE TO OTHER ALLERGIC TRIGGER: ICD-10-CM

## 2024-01-08 DIAGNOSIS — E03.4 HYPOTHYROIDISM DUE TO ACQUIRED ATROPHY OF THYROID: ICD-10-CM

## 2024-01-08 DIAGNOSIS — I10 ESSENTIAL HYPERTENSION: ICD-10-CM

## 2024-01-08 DIAGNOSIS — E28.2 PCOS (POLYCYSTIC OVARIAN SYNDROME): ICD-10-CM

## 2024-01-08 DIAGNOSIS — E78.2 MIXED HYPERLIPIDEMIA: ICD-10-CM

## 2024-01-08 RX ORDER — FLUTICASONE PROPIONATE 50 MCG
2 SPRAY, SUSPENSION (ML) NASAL DAILY
Qty: 48 G | Refills: 3 | OUTPATIENT
Start: 2024-01-08

## 2024-01-08 RX ORDER — MONTELUKAST SODIUM 10 MG/1
10 TABLET ORAL DAILY
Qty: 90 TABLET | Refills: 3 | OUTPATIENT
Start: 2024-01-08

## 2024-01-08 RX ORDER — LEVOTHYROXINE SODIUM 88 UG/1
88 TABLET ORAL DAILY
Qty: 90 TABLET | Refills: 3 | OUTPATIENT
Start: 2024-01-08

## 2024-01-08 RX ORDER — SPIRONOLACTONE 100 MG/1
100 TABLET, FILM COATED ORAL 2 TIMES DAILY
Qty: 180 TABLET | Refills: 3 | OUTPATIENT
Start: 2024-01-08

## 2024-01-08 RX ORDER — HYDRALAZINE HYDROCHLORIDE 25 MG/1
25 TABLET, FILM COATED ORAL 2 TIMES DAILY
Qty: 180 TABLET | Refills: 3 | OUTPATIENT
Start: 2024-01-08

## 2024-01-10 NOTE — RESULT ENCOUNTER NOTE
Call pt, labs ok/chol ok- CHOL SL HIGHER FROM [phone] BUT IS FROM INCREASED HDL HER GOOD CHOL, LIKELY FROM HER REGULAR EXERCISE

## 2024-01-12 ENCOUNTER — OFFICE VISIT (OUTPATIENT)
Dept: INTERNAL MEDICINE CLINIC | Age: 64
End: 2024-01-12
Payer: MEDICARE

## 2024-01-12 VITALS
OXYGEN SATURATION: 99 % | SYSTOLIC BLOOD PRESSURE: 138 MMHG | DIASTOLIC BLOOD PRESSURE: 88 MMHG | TEMPERATURE: 96.6 F | BODY MASS INDEX: 30.43 KG/M2 | WEIGHT: 166.4 LBS | HEART RATE: 55 BPM

## 2024-01-12 DIAGNOSIS — I10 PRIMARY HYPERTENSION: Primary | ICD-10-CM

## 2024-01-12 DIAGNOSIS — E03.4 HYPOTHYROIDISM DUE TO ACQUIRED ATROPHY OF THYROID: ICD-10-CM

## 2024-01-12 DIAGNOSIS — R06.2 WHEEZING: ICD-10-CM

## 2024-01-12 DIAGNOSIS — E28.2 PCOS (POLYCYSTIC OVARIAN SYNDROME): ICD-10-CM

## 2024-01-12 DIAGNOSIS — E78.2 MIXED HYPERLIPIDEMIA: ICD-10-CM

## 2024-01-12 DIAGNOSIS — R73.01 IFG (IMPAIRED FASTING GLUCOSE): ICD-10-CM

## 2024-01-12 PROCEDURE — 3075F SYST BP GE 130 - 139MM HG: CPT | Performed by: INTERNAL MEDICINE

## 2024-01-12 PROCEDURE — 3079F DIAST BP 80-89 MM HG: CPT | Performed by: INTERNAL MEDICINE

## 2024-01-12 PROCEDURE — 1123F ACP DISCUSS/DSCN MKR DOCD: CPT | Performed by: INTERNAL MEDICINE

## 2024-01-12 PROCEDURE — 99214 OFFICE O/P EST MOD 30 MIN: CPT | Performed by: INTERNAL MEDICINE

## 2024-01-12 RX ORDER — ALBUTEROL SULFATE 90 UG/1
2 AEROSOL, METERED RESPIRATORY (INHALATION) EVERY 6 HOURS PRN
Qty: 3 EACH | Refills: 1 | Status: SHIPPED | OUTPATIENT
Start: 2024-01-12

## 2024-01-12 NOTE — PROGRESS NOTES
Natalya Mcdonnell Bryanna  1960 01/12/24    SUBJECTIVE:    Hypertension: Stable. Denies CP, SOB, cough, visual changes, dizziness, palpitations or HA.      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    Hypothyroid has been asymptomatic w/o sx of fatigue, constipation, cold intolerance, depression.    PCOS and obesity, wt stable and also on metformin.    Lipids:  Has history of high cholesterol, not on medication but trying to continue regular low fat diet and maintenance of weight, regular exercise.      OBJECTIVE:    /88 (Site: Left Upper Arm, Position: Sitting, Cuff Size: Medium Adult)   Pulse 55   Temp (!) 96.6 °F (35.9 °C) (Infrared)   Wt 75.5 kg (166 lb 6.4 oz)   SpO2 99%   BMI 30.43 kg/m²     Physical Exam  Vitals reviewed.   Constitutional:       Appearance: She is well-developed.   HENT:      Head: Normocephalic and atraumatic.      Nose: Nose normal.      Mouth/Throat:      Mouth: Mucous membranes are moist.      Pharynx: Oropharynx is clear. No oropharyngeal exudate.   Eyes:      General: No scleral icterus.        Right eye: No discharge.         Left eye: No discharge.      Conjunctiva/sclera: Conjunctivae normal.      Pupils: Pupils are equal, round, and reactive to light.   Neck:      Thyroid: No thyromegaly.      Vascular: No JVD.      Trachea: No tracheal deviation.   Cardiovascular:      Rate and Rhythm: Normal rate and regular rhythm.      Heart sounds: Normal heart sounds. No murmur heard.     No friction rub. No gallop.   Pulmonary:      Effort: Pulmonary effort is normal. No respiratory distress.      Breath sounds: Normal breath sounds. No wheezing or rales.   Abdominal:      General: Bowel sounds are normal. There is no distension.      Palpations: Abdomen is soft. There is no mass.      Tenderness: There is no abdominal tenderness. There is no guarding or rebound.      Hernia: No hernia is present.   Musculoskeletal:

## 2024-01-18 DIAGNOSIS — R06.2 WHEEZING: ICD-10-CM

## 2024-01-18 DIAGNOSIS — J30.89 NON-SEASONAL ALLERGIC RHINITIS DUE TO OTHER ALLERGIC TRIGGER: ICD-10-CM

## 2024-01-18 DIAGNOSIS — E28.2 PCOS (POLYCYSTIC OVARIAN SYNDROME): ICD-10-CM

## 2024-01-18 DIAGNOSIS — E03.4 HYPOTHYROIDISM DUE TO ACQUIRED ATROPHY OF THYROID: ICD-10-CM

## 2024-01-18 DIAGNOSIS — I10 ESSENTIAL HYPERTENSION: ICD-10-CM

## 2024-01-18 RX ORDER — MONTELUKAST SODIUM 10 MG/1
10 TABLET ORAL DAILY
Qty: 90 TABLET | Refills: 1 | Status: SHIPPED | OUTPATIENT
Start: 2024-01-18 | End: 2024-01-19 | Stop reason: SDUPTHER

## 2024-01-18 RX ORDER — SPIRONOLACTONE 100 MG/1
100 TABLET, FILM COATED ORAL 2 TIMES DAILY
Qty: 180 TABLET | Refills: 1 | Status: SHIPPED | OUTPATIENT
Start: 2024-01-18 | End: 2024-01-19 | Stop reason: SDUPTHER

## 2024-01-18 RX ORDER — AZELASTINE HYDROCHLORIDE 137 UG/1
1 SPRAY, METERED NASAL 2 TIMES DAILY PRN
Qty: 90 ML | Refills: 1 | Status: SHIPPED | OUTPATIENT
Start: 2024-01-18

## 2024-01-18 RX ORDER — LEVOTHYROXINE SODIUM 88 UG/1
88 TABLET ORAL DAILY
Qty: 90 TABLET | Refills: 1 | Status: SHIPPED | OUTPATIENT
Start: 2024-01-18 | End: 2024-01-19 | Stop reason: SDUPTHER

## 2024-01-18 RX ORDER — HYDRALAZINE HYDROCHLORIDE 25 MG/1
25 TABLET, FILM COATED ORAL 2 TIMES DAILY
Qty: 180 TABLET | Refills: 1 | Status: SHIPPED | OUTPATIENT
Start: 2024-01-18 | End: 2024-01-19 | Stop reason: SDUPTHER

## 2024-01-18 RX ORDER — FLUTICASONE PROPIONATE 50 MCG
2 SPRAY, SUSPENSION (ML) NASAL DAILY
Qty: 3 EACH | Refills: 1 | Status: SHIPPED | OUTPATIENT
Start: 2024-01-18

## 2024-01-19 DIAGNOSIS — E03.4 HYPOTHYROIDISM DUE TO ACQUIRED ATROPHY OF THYROID: ICD-10-CM

## 2024-01-19 DIAGNOSIS — I10 ESSENTIAL HYPERTENSION: ICD-10-CM

## 2024-01-19 DIAGNOSIS — R06.2 WHEEZING: ICD-10-CM

## 2024-01-19 DIAGNOSIS — E28.2 PCOS (POLYCYSTIC OVARIAN SYNDROME): ICD-10-CM

## 2024-01-19 DIAGNOSIS — J30.89 NON-SEASONAL ALLERGIC RHINITIS DUE TO OTHER ALLERGIC TRIGGER: ICD-10-CM

## 2024-01-19 RX ORDER — LEVOTHYROXINE SODIUM 88 UG/1
88 TABLET ORAL DAILY
Qty: 90 TABLET | Refills: 1 | Status: SHIPPED | OUTPATIENT
Start: 2024-01-19

## 2024-01-19 RX ORDER — MONTELUKAST SODIUM 10 MG/1
10 TABLET ORAL DAILY
Qty: 90 TABLET | Refills: 1 | Status: SHIPPED | OUTPATIENT
Start: 2024-01-19

## 2024-01-19 RX ORDER — SPIRONOLACTONE 100 MG/1
100 TABLET, FILM COATED ORAL 2 TIMES DAILY
Qty: 180 TABLET | Refills: 1 | Status: SHIPPED | OUTPATIENT
Start: 2024-01-19

## 2024-01-19 RX ORDER — HYDRALAZINE HYDROCHLORIDE 25 MG/1
25 TABLET, FILM COATED ORAL 2 TIMES DAILY
Qty: 180 TABLET | Refills: 1 | Status: SHIPPED | OUTPATIENT
Start: 2024-01-19

## 2024-02-29 ENCOUNTER — OFFICE VISIT (OUTPATIENT)
Dept: INTERNAL MEDICINE CLINIC | Age: 64
End: 2024-02-29
Payer: MEDICARE

## 2024-02-29 VITALS
BODY MASS INDEX: 30 KG/M2 | DIASTOLIC BLOOD PRESSURE: 71 MMHG | SYSTOLIC BLOOD PRESSURE: 120 MMHG | HEIGHT: 62 IN | WEIGHT: 163 LBS | RESPIRATION RATE: 16 BRPM | HEART RATE: 79 BPM | TEMPERATURE: 98.5 F | OXYGEN SATURATION: 100 %

## 2024-02-29 DIAGNOSIS — N30.00 ACUTE CYSTITIS WITHOUT HEMATURIA: Primary | ICD-10-CM

## 2024-02-29 DIAGNOSIS — R35.0 URINE FREQUENCY: ICD-10-CM

## 2024-02-29 LAB
BILIRUBIN, POC: NORMAL
BLOOD URINE, POC: NORMAL
CLARITY, POC: NORMAL
COLOR, POC: YELLOW
GLUCOSE URINE, POC: NORMAL
KETONES, POC: NORMAL
LEUKOCYTE EST, POC: NORMAL
NITRITE, POC: NORMAL
PH, POC: 5.5
PROTEIN, POC: NORMAL
SPECIFIC GRAVITY, POC: 1.01
UROBILINOGEN, POC: 0.2

## 2024-02-29 PROCEDURE — 3074F SYST BP LT 130 MM HG: CPT | Performed by: INTERNAL MEDICINE

## 2024-02-29 PROCEDURE — 1123F ACP DISCUSS/DSCN MKR DOCD: CPT | Performed by: INTERNAL MEDICINE

## 2024-02-29 PROCEDURE — 81002 URINALYSIS NONAUTO W/O SCOPE: CPT | Performed by: INTERNAL MEDICINE

## 2024-02-29 PROCEDURE — 3078F DIAST BP <80 MM HG: CPT | Performed by: INTERNAL MEDICINE

## 2024-02-29 PROCEDURE — 99213 OFFICE O/P EST LOW 20 MIN: CPT | Performed by: INTERNAL MEDICINE

## 2024-02-29 RX ORDER — CIPROFLOXACIN 250 MG/1
250 TABLET, FILM COATED ORAL 2 TIMES DAILY
Qty: 14 TABLET | Refills: 0 | Status: SHIPPED | OUTPATIENT
Start: 2024-02-29 | End: 2024-03-07

## 2024-02-29 NOTE — PROGRESS NOTES
Natalya Mcdonnell Bryanna  1960 02/29/24    SUBJECTIVE:    The past 3-4d w urinary urge, freq and then chills, shivers.  Low gr fever yesterday.  UA poct today is pos mod LE    OBJECTIVE:    /71   Pulse 79   Temp 98.5 °F (36.9 °C)   Resp 16   Ht 1.575 m (5' 2.01\")   Wt 73.9 kg (163 lb)   SpO2 100%   BMI 29.81 kg/m²     Physical Exam  Constitutional:       Appearance: Normal appearance.   HENT:      Head: Normocephalic and atraumatic.   Cardiovascular:      Rate and Rhythm: Normal rate and regular rhythm.      Heart sounds: Normal heart sounds. No murmur heard.  Pulmonary:      Effort: Pulmonary effort is normal. No respiratory distress.      Breath sounds: Normal breath sounds.   Abdominal:      General: Abdomen is flat. Bowel sounds are normal. There is no distension.      Palpations: Abdomen is soft. There is no mass.      Tenderness: There is no abdominal tenderness. There is no right CVA tenderness or left CVA tenderness.      Hernia: No hernia is present.   Neurological:      Mental Status: She is alert.   Psychiatric:         Mood and Affect: Mood normal.         ASSESSMENT:    1. Acute cystitis without hematuria    2. Urine frequency        PLAN:    Natalya was seen today for urinary frequency and fever.    Diagnoses and all orders for this visit:    Acute cystitis without hematuria - Will rx UTI w atbs as noted, also encouraged pushing fluids.  To call back one week if sx persist.    -     ciprofloxacin (CIPRO) 250 MG tablet; Take 1 tablet by mouth 2 times daily for 7 days    Urine frequency  -     POCT Urinalysis no Micro  -     ciprofloxacin (CIPRO) 250 MG tablet; Take 1 tablet by mouth 2 times daily for 7 days

## 2024-05-28 LAB
A/G RATIO: 2 RATIO (ref 0.8–2.6)
ALBUMIN: 4.4 G/DL (ref 3.5–5.2)
ALP BLD-CCNC: 83 U/L (ref 23–144)
ALT SERPL-CCNC: 25 U/L (ref 0–60)
AST SERPL-CCNC: 18 U/L (ref 0–55)
BILIRUB SERPL-MCNC: 0.3 MG/DL (ref 0–1.2)
BUN / CREAT RATIO: 17 (ref 7–25)
BUN BLDV-MCNC: 15 MG/DL (ref 3–29)
CALCIUM SERPL-MCNC: 10.1 MG/DL (ref 8.5–10.5)
CHLORIDE BLD-SCNC: 105 MEQ/L (ref 96–110)
CHOLESTEROL, TOTAL: 219 MG/DL
CO2: 28 MEQ/L (ref 19–32)
CREAT SERPL-MCNC: 0.9 MG/DL (ref 0.5–1.2)
ESTIMATED GLOMERULAR FILTRATION RATE CREATININE EQUATION: 71 MLS/MIN/1.73M2
FASTING STATUS: ABNORMAL
GLOBULIN: 2.2 G/DL (ref 1.9–3.6)
GLUCOSE BLD-MCNC: 100 MG/DL (ref 70–99)
HBA1C MFR BLD: 5.6 % (ref 4–6)
HCT VFR BLD CALC: 40 % (ref 34–49)
HDLC SERPL-MCNC: 68 MG/DL
HEMOGLOBIN: 13.2 G/DL (ref 11.2–15.7)
LDL CHOLESTEROL: 129 MG/DL
MCH RBC QN AUTO: 30.1 PG (ref 26–34)
MCHC RBC AUTO-ENTMCNC: 33 G/DL (ref 30.7–35.5)
MCV RBC AUTO: 91.3 FL (ref 80–100)
PDW BLD-RTO: 12.3 %
PLATELET # BLD: 343 K/UL (ref 140–400)
PMV BLD AUTO: 11.2 FL (ref 7.2–11.7)
POTASSIUM SERPL-SCNC: 5.3 MEQ/L (ref 3.4–5.3)
RBC # BLD: 4.38 M/UL (ref 3.95–5.26)
SODIUM BLD-SCNC: 142 MEQ/L (ref 135–148)
T4 FREE: 1.76 NG/DL (ref 0.8–1.8)
TOTAL PROTEIN: 6.6 G/DL (ref 6–8.3)
TRIGL SERPL-MCNC: 109 MG/DL
TSH ULTRASENSITIVE: 1.33 MCIU/ML (ref 0.4–4.5)
VLDLC SERPL CALC-MCNC: 22 MG/DL (ref 4–38)
WBC # BLD: 8.7 K/UL (ref 3.5–10.9)

## 2024-05-31 ASSESSMENT — PATIENT HEALTH QUESTIONNAIRE - PHQ9
SUM OF ALL RESPONSES TO PHQ QUESTIONS 1-9: 0
SUM OF ALL RESPONSES TO PHQ9 QUESTIONS 1 & 2: 0
2. FEELING DOWN, DEPRESSED OR HOPELESS: NOT AT ALL
SUM OF ALL RESPONSES TO PHQ QUESTIONS 1-9: 0
2. FEELING DOWN, DEPRESSED OR HOPELESS: NOT AT ALL
1. LITTLE INTEREST OR PLEASURE IN DOING THINGS: NOT AT ALL
1. LITTLE INTEREST OR PLEASURE IN DOING THINGS: NOT AT ALL
SUM OF ALL RESPONSES TO PHQ QUESTIONS 1-9: 0
SUM OF ALL RESPONSES TO PHQ QUESTIONS 1-9: 0
SUM OF ALL RESPONSES TO PHQ9 QUESTIONS 1 & 2: 0

## 2024-06-03 ENCOUNTER — OFFICE VISIT (OUTPATIENT)
Dept: INTERNAL MEDICINE CLINIC | Age: 64
End: 2024-06-03
Payer: MEDICARE

## 2024-06-03 VITALS
DIASTOLIC BLOOD PRESSURE: 65 MMHG | HEART RATE: 73 BPM | BODY MASS INDEX: 30.18 KG/M2 | SYSTOLIC BLOOD PRESSURE: 120 MMHG | HEIGHT: 62 IN | RESPIRATION RATE: 16 BRPM | OXYGEN SATURATION: 97 % | WEIGHT: 164 LBS

## 2024-06-03 DIAGNOSIS — J30.89 NON-SEASONAL ALLERGIC RHINITIS DUE TO OTHER ALLERGIC TRIGGER: ICD-10-CM

## 2024-06-03 DIAGNOSIS — I10 ESSENTIAL HYPERTENSION: Primary | ICD-10-CM

## 2024-06-03 DIAGNOSIS — Z12.31 VISIT FOR SCREENING MAMMOGRAM: ICD-10-CM

## 2024-06-03 DIAGNOSIS — E78.2 MIXED HYPERLIPIDEMIA: ICD-10-CM

## 2024-06-03 DIAGNOSIS — E03.4 HYPOTHYROIDISM DUE TO ACQUIRED ATROPHY OF THYROID: ICD-10-CM

## 2024-06-03 DIAGNOSIS — E28.2 PCOS (POLYCYSTIC OVARIAN SYNDROME): ICD-10-CM

## 2024-06-03 DIAGNOSIS — R73.01 IFG (IMPAIRED FASTING GLUCOSE): ICD-10-CM

## 2024-06-03 DIAGNOSIS — R06.2 WHEEZING: ICD-10-CM

## 2024-06-03 DIAGNOSIS — M81.0 AGE-RELATED OSTEOPOROSIS WITHOUT CURRENT PATHOLOGICAL FRACTURE: ICD-10-CM

## 2024-06-03 PROCEDURE — 3074F SYST BP LT 130 MM HG: CPT | Performed by: INTERNAL MEDICINE

## 2024-06-03 PROCEDURE — 1123F ACP DISCUSS/DSCN MKR DOCD: CPT | Performed by: INTERNAL MEDICINE

## 2024-06-03 PROCEDURE — 99214 OFFICE O/P EST MOD 30 MIN: CPT | Performed by: INTERNAL MEDICINE

## 2024-06-03 PROCEDURE — 3078F DIAST BP <80 MM HG: CPT | Performed by: INTERNAL MEDICINE

## 2024-06-03 RX ORDER — FLUTICASONE PROPIONATE 50 MCG
2 SPRAY, SUSPENSION (ML) NASAL DAILY
Qty: 3 EACH | Refills: 1 | Status: SHIPPED | OUTPATIENT
Start: 2024-06-03

## 2024-06-03 RX ORDER — ALBUTEROL SULFATE 90 UG/1
2 AEROSOL, METERED RESPIRATORY (INHALATION) EVERY 6 HOURS PRN
Qty: 3 EACH | Refills: 1 | Status: SHIPPED | OUTPATIENT
Start: 2024-06-03

## 2024-06-03 RX ORDER — MONTELUKAST SODIUM 10 MG/1
10 TABLET ORAL DAILY
Qty: 90 TABLET | Refills: 1 | Status: SHIPPED | OUTPATIENT
Start: 2024-06-03

## 2024-06-03 RX ORDER — ROSUVASTATIN CALCIUM 5 MG/1
5 TABLET, COATED ORAL NIGHTLY
Qty: 90 TABLET | Refills: 1 | Status: SHIPPED | OUTPATIENT
Start: 2024-06-03

## 2024-06-03 RX ORDER — SPIRONOLACTONE 100 MG/1
100 TABLET, FILM COATED ORAL 2 TIMES DAILY
Qty: 180 TABLET | Refills: 1 | Status: SHIPPED | OUTPATIENT
Start: 2024-06-03

## 2024-06-03 RX ORDER — HYDRALAZINE HYDROCHLORIDE 25 MG/1
25 TABLET, FILM COATED ORAL 2 TIMES DAILY
Qty: 180 TABLET | Refills: 1 | Status: SHIPPED | OUTPATIENT
Start: 2024-06-03

## 2024-06-03 RX ORDER — LEVOTHYROXINE SODIUM 88 UG/1
88 TABLET ORAL DAILY
Qty: 90 TABLET | Refills: 1 | Status: SHIPPED | OUTPATIENT
Start: 2024-06-03

## 2024-06-03 NOTE — PROGRESS NOTES
Natalya Mcdonnell Bryanna  1960 06/03/24    SUBJECTIVE:  going on trip to [place] for horseback travel for 5d.      Hypertension: Stable. Denies CP, SOB, cough, visual changes, dizziness, palpitations or HA.      Hypothyroid has been asymptomatic w/o sx of fatigue, constipation, cold intolerance, depression.    Wheezing w prob Asthma:  Patient denies significant chest pain/tightness, dyspnea, decreased exercise tolerance, cough, or wheezing on current regimen.    IFG, A1c stable, glc sl higher 100.    Lab Results   Component Value Date    LABA1C 5.6 05/28/2024     Lab Results   Component Value Date    CREATININE 0.9 05/28/2024     Lipids, LDL sl high 120s, w fhx cad, we discussed trying a low dose crestor   OBJECTIVE:    /65   Pulse 73   Resp 16   Ht 1.575 m (5' 2.01\")   Wt 74.4 kg (164 lb)   SpO2 97%   BMI 29.99 kg/m²     Physical Exam  Vitals reviewed.   Constitutional:       General: She is not in acute distress.     Appearance: She is well-developed.   HENT:      Head: Normocephalic and atraumatic.      Mouth/Throat:      Pharynx: No oropharyngeal exudate.   Eyes:      General: No scleral icterus.        Right eye: No discharge.         Left eye: No discharge.      Conjunctiva/sclera: Conjunctivae normal.      Pupils: Pupils are equal, round, and reactive to light.   Neck:      Thyroid: No thyromegaly.      Vascular: No JVD.      Trachea: No tracheal deviation.   Cardiovascular:      Rate and Rhythm: Normal rate and regular rhythm.      Heart sounds: Normal heart sounds. No murmur heard.     No friction rub. No gallop.   Pulmonary:      Effort: Pulmonary effort is normal. No respiratory distress.      Breath sounds: Normal breath sounds. No wheezing or rales.   Abdominal:      General: Bowel sounds are normal. There is no distension.      Palpations: Abdomen is soft. There is no mass.      Tenderness: There is no abdominal tenderness. There is no guarding or rebound.   Musculoskeletal:      Right

## 2024-07-29 DIAGNOSIS — Z12.31 VISIT FOR SCREENING MAMMOGRAM: ICD-10-CM

## 2024-07-29 DIAGNOSIS — M81.0 AGE-RELATED OSTEOPOROSIS WITHOUT CURRENT PATHOLOGICAL FRACTURE: ICD-10-CM

## 2024-08-02 LAB
A/G RATIO: 2 RATIO (ref 0.8–2.6)
ALBUMIN: 4.5 G/DL (ref 3.5–5.2)
ALP BLD-CCNC: 92 U/L (ref 23–144)
ALT SERPL-CCNC: 16 U/L (ref 0–60)
AST SERPL-CCNC: 14 U/L (ref 0–55)
BILIRUB SERPL-MCNC: 0.3 MG/DL (ref 0–1.2)
BUN / CREAT RATIO: 14 (ref 7–25)
BUN BLDV-MCNC: 14 MG/DL (ref 3–29)
CALCIUM SERPL-MCNC: 10.8 MG/DL (ref 8.5–10.5)
CHLORIDE BLD-SCNC: 100 MEQ/L (ref 96–110)
CHOLESTEROL, TOTAL: 202 MG/DL
CO2: 26 MEQ/L (ref 19–32)
CREAT SERPL-MCNC: 1 MG/DL (ref 0.5–1.2)
ESTIMATED GLOMERULAR FILTRATION RATE CREATININE EQUATION: 62 MLS/MIN/1.73M2
FASTING STATUS: ABNORMAL
GLOBULIN: 2.2 G/DL (ref 1.9–3.6)
GLUCOSE BLD-MCNC: 93 MG/DL (ref 70–99)
HDLC SERPL-MCNC: 68 MG/DL
LDL CHOLESTEROL: 112 MG/DL
POTASSIUM SERPL-SCNC: 5.4 MEQ/L (ref 3.4–5.3)
SODIUM BLD-SCNC: 140 MEQ/L (ref 135–148)
TOTAL PROTEIN: 6.7 G/DL (ref 6–8.3)
TRIGL SERPL-MCNC: 112 MG/DL
VLDLC SERPL CALC-MCNC: 22 MG/DL (ref 4–38)

## 2024-08-04 DIAGNOSIS — E28.2 PCOS (POLYCYSTIC OVARIAN SYNDROME): ICD-10-CM

## 2024-08-04 DIAGNOSIS — M81.0 AGE-RELATED OSTEOPOROSIS WITHOUT CURRENT PATHOLOGICAL FRACTURE: Primary | ICD-10-CM

## 2024-08-04 RX ORDER — SPIRONOLACTONE 100 MG/1
100 TABLET, FILM COATED ORAL DAILY
Qty: 90 TABLET | Refills: 1
Start: 2024-08-04

## 2024-08-04 RX ORDER — ALENDRONATE SODIUM 70 MG/1
70 TABLET ORAL
Qty: 13 TABLET | Refills: 1 | Status: SHIPPED | OUTPATIENT
Start: 2024-08-04

## 2024-09-04 ENCOUNTER — OFFICE VISIT (OUTPATIENT)
Dept: INTERNAL MEDICINE CLINIC | Age: 64
End: 2024-09-04
Payer: MEDICARE

## 2024-09-04 VITALS
OXYGEN SATURATION: 96 % | BODY MASS INDEX: 30.36 KG/M2 | HEIGHT: 62 IN | SYSTOLIC BLOOD PRESSURE: 128 MMHG | RESPIRATION RATE: 16 BRPM | DIASTOLIC BLOOD PRESSURE: 76 MMHG | WEIGHT: 165 LBS | HEART RATE: 52 BPM

## 2024-09-04 DIAGNOSIS — H10.32 ACUTE CONJUNCTIVITIS OF LEFT EYE, UNSPECIFIED ACUTE CONJUNCTIVITIS TYPE: ICD-10-CM

## 2024-09-04 DIAGNOSIS — R51.9 LEFT TEMPORAL HEADACHE: Primary | ICD-10-CM

## 2024-09-04 PROCEDURE — 3074F SYST BP LT 130 MM HG: CPT | Performed by: INTERNAL MEDICINE

## 2024-09-04 PROCEDURE — G2211 COMPLEX E/M VISIT ADD ON: HCPCS | Performed by: INTERNAL MEDICINE

## 2024-09-04 PROCEDURE — 99213 OFFICE O/P EST LOW 20 MIN: CPT | Performed by: INTERNAL MEDICINE

## 2024-09-04 PROCEDURE — 3078F DIAST BP <80 MM HG: CPT | Performed by: INTERNAL MEDICINE

## 2024-09-04 PROCEDURE — 1123F ACP DISCUSS/DSCN MKR DOCD: CPT | Performed by: INTERNAL MEDICINE

## 2024-09-04 RX ORDER — TOBRAMYCIN 3 MG/ML
1 SOLUTION/ DROPS OPHTHALMIC EVERY 6 HOURS
Qty: 1 EACH | Refills: 0 | Status: SHIPPED | OUTPATIENT
Start: 2024-09-04 | End: 2024-09-14

## 2024-09-04 RX ORDER — PREDNISONE 5 MG/1
TABLET ORAL
Qty: 36 TABLET | Refills: 0 | Status: SHIPPED | OUTPATIENT
Start: 2024-09-04

## 2024-09-04 SDOH — ECONOMIC STABILITY: INCOME INSECURITY: HOW HARD IS IT FOR YOU TO PAY FOR THE VERY BASICS LIKE FOOD, HOUSING, MEDICAL CARE, AND HEATING?: NOT HARD AT ALL

## 2024-09-04 SDOH — ECONOMIC STABILITY: FOOD INSECURITY: WITHIN THE PAST 12 MONTHS, YOU WORRIED THAT YOUR FOOD WOULD RUN OUT BEFORE YOU GOT MONEY TO BUY MORE.: NEVER TRUE

## 2024-09-04 SDOH — ECONOMIC STABILITY: FOOD INSECURITY: WITHIN THE PAST 12 MONTHS, THE FOOD YOU BOUGHT JUST DIDN'T LAST AND YOU DIDN'T HAVE MONEY TO GET MORE.: NEVER TRUE

## 2024-09-04 NOTE — PROGRESS NOTES
Natalya Mcdonnell Bryanna  1960 09/04/24    SUBJECTIVE:   THIS PAST WEEK W SUDDEN ONSET OF L EYE REDNESS, HAS MILD HEADACHE AND BLURRED VISION.  SX ARE CONSTANT, HAS NOT HAD ANY MATTING, DRAINAGE.   SX FEEL DIFFERENT THAN PRIOR EPISODES OF PINK EYE.  OBJECTIVE:    /76   Pulse 52   Resp 16   Ht 1.575 m (5' 2.01\")   Wt 74.8 kg (165 lb)   SpO2 96%   BMI 30.17 kg/m²     Physical Exam  Constitutional:       Appearance: Normal appearance.   HENT:      Head:      Comments: SL TENDER L TEMPORAL REGION  Eyes:      General: No scleral icterus.        Right eye: No discharge.         Left eye: No discharge.      Extraocular Movements: Extraocular movements intact.      Pupils: Pupils are equal, round, and reactive to light.      Comments: SL INJECTION OF L CONJUNCTIVA, NO MATTING OR DRAINAGE, NO PHOTOPHOBIA   Neurological:      General: No focal deficit present.      Mental Status: She is alert.      Cranial Nerves: No cranial nerve deficit.         ASSESSMENT:    1. Left temporal headache        PLAN:  DDX WOULD INCL CONJUNCTIVITIS- SHE STATES FEELS DIFFERENT THAN PRIOR PINK EYE EPISODES, ALSO CONSIDER AC ANGLE GLAUCOMA, TEMPORAL ARTERITIS.  WE'LL SET UP ASAP W OPTHO, ALSO SCR INFLAMMATORY MARKERS, START EMPIRIC ABX DROPS.    Diagnoses and all orders for this visit:    Left temporal headache  -     C-Reactive Protein; Future  -     Sedimentation Rate; Future  -     predniSONE (DELTASONE) 5 MG tablet; Take 8 pills, then 7,6,5,4,3,2,1  -     tobramycin (TOBREX) 0.3 % ophthalmic solution; Place 1 drop into the left eye every 6 hours for 10 days  -     Amb External Referral To Ophthalmology    Acute conjunctivitis of left eye, unspecified acute conjunctivitis type

## 2024-09-05 LAB
C REACTIVE PROTEIN (CRP), BODY FLUID: 0.54 MG/DL
SED RATE, AUTOMATED: 9 MM/HR (ref 0–30)

## 2024-09-06 NOTE — RESULT ENCOUNTER NOTE
Please call pt, her inflammation level is normal-- PLS ASK HOW HER HEADACHE AND EYE EVAL WENT, IS SHE DOING OK AND WHAT WAS THE OPTHO DIAGNOSIS PLEASE?

## 2024-09-11 ENCOUNTER — PATIENT MESSAGE (OUTPATIENT)
Dept: INTERNAL MEDICINE CLINIC | Age: 64
End: 2024-09-11

## 2024-11-26 LAB
A/G RATIO: 2 RATIO (ref 0.8–2.6)
ALBUMIN: 4.5 G/DL (ref 3.5–5.2)
ALP BLD-CCNC: 78 U/L (ref 23–144)
ALT SERPL-CCNC: 19 U/L (ref 0–60)
AST SERPL-CCNC: 16 U/L (ref 0–55)
BILIRUB SERPL-MCNC: 0.4 MG/DL (ref 0–1.2)
BUN / CREAT RATIO: 16 (ref 7–25)
BUN BLDV-MCNC: 14 MG/DL (ref 3–29)
CALCIUM SERPL-MCNC: 9.7 MG/DL (ref 8.5–10.5)
CHLORIDE BLD-SCNC: 105 MEQ/L (ref 96–110)
CHOLESTEROL, TOTAL: 199 MG/DL
CO2: 27 MEQ/L (ref 19–32)
CREAT SERPL-MCNC: 0.9 MG/DL (ref 0.5–1.2)
ESTIMATED GLOMERULAR FILTRATION RATE CREATININE EQUATION: 71 MLS/MIN/1.73M2
FASTING STATUS: ABNORMAL
GLOBULIN: 2.2 G/DL (ref 1.9–3.6)
GLUCOSE BLD-MCNC: 102 MG/DL (ref 70–99)
HBA1C MFR BLD: 5.4 %
HCT VFR BLD CALC: 43.9 % (ref 34–49)
HDLC SERPL-MCNC: 79 MG/DL
HEMOGLOBIN: 14.1 G/DL (ref 11.2–15.7)
LDL CHOLESTEROL: 100 MG/DL
MCH RBC QN AUTO: 29.5 PG (ref 26–34)
MCHC RBC AUTO-ENTMCNC: 32.1 G/DL (ref 30.7–35.5)
MCV RBC AUTO: 91.8 FL (ref 80–100)
PDW BLD-RTO: 11.8 %
PLATELET # BLD: 350 K/UL (ref 140–400)
PMV BLD AUTO: 10.8 FL (ref 7.2–11.7)
POTASSIUM SERPL-SCNC: 4.8 MEQ/L (ref 3.4–5.3)
RBC # BLD: 4.78 M/UL (ref 3.95–5.26)
SODIUM BLD-SCNC: 144 MEQ/L (ref 135–148)
T4 FREE: 1.67 NG/DL (ref 0.8–1.8)
TOTAL PROTEIN: 6.7 G/DL (ref 6–8.3)
TRIGL SERPL-MCNC: 101 MG/DL
TSH ULTRASENSITIVE: 1.51 MCIU/ML (ref 0.4–4.5)
VITAMIN D 25-HYDROXY: 37 NG/ML (ref 30–100)
VLDLC SERPL CALC-MCNC: 20 MG/DL (ref 4–38)
WBC # BLD: 8.2 K/UL (ref 3.5–10.9)

## 2024-11-27 NOTE — RESULT ENCOUNTER NOTE
Please call pt, lab ok incl chol level, vit D, thyroid fxn and her A1c is better too dropping from 5.6--5.4

## 2024-12-02 DIAGNOSIS — J30.89 NON-SEASONAL ALLERGIC RHINITIS DUE TO OTHER ALLERGIC TRIGGER: ICD-10-CM

## 2024-12-02 DIAGNOSIS — R06.2 WHEEZING: ICD-10-CM

## 2024-12-02 DIAGNOSIS — E28.2 PCOS (POLYCYSTIC OVARIAN SYNDROME): ICD-10-CM

## 2024-12-02 DIAGNOSIS — E03.4 HYPOTHYROIDISM DUE TO ACQUIRED ATROPHY OF THYROID: ICD-10-CM

## 2024-12-02 DIAGNOSIS — I10 ESSENTIAL HYPERTENSION: ICD-10-CM

## 2024-12-02 RX ORDER — HYDRALAZINE HYDROCHLORIDE 25 MG/1
25 TABLET, FILM COATED ORAL 2 TIMES DAILY
Qty: 180 TABLET | Refills: 0 | Status: SHIPPED | OUTPATIENT
Start: 2024-12-02 | End: 2024-12-03 | Stop reason: SDUPTHER

## 2024-12-02 RX ORDER — LEVOTHYROXINE SODIUM 88 UG/1
88 TABLET ORAL DAILY
Qty: 90 TABLET | Refills: 0 | Status: SHIPPED | OUTPATIENT
Start: 2024-12-02 | End: 2024-12-03 | Stop reason: SDUPTHER

## 2024-12-02 RX ORDER — SPIRONOLACTONE 100 MG/1
100 TABLET, FILM COATED ORAL 2 TIMES DAILY
Qty: 180 TABLET | Refills: 0 | Status: SHIPPED | OUTPATIENT
Start: 2024-12-02 | End: 2024-12-03 | Stop reason: SDUPTHER

## 2024-12-02 RX ORDER — FLUTICASONE PROPIONATE 50 MCG
2 SPRAY, SUSPENSION (ML) NASAL DAILY
Qty: 48 G | Refills: 0 | Status: SHIPPED | OUTPATIENT
Start: 2024-12-02

## 2024-12-02 RX ORDER — MONTELUKAST SODIUM 10 MG/1
10 TABLET ORAL DAILY
Qty: 90 TABLET | Refills: 0 | Status: SHIPPED | OUTPATIENT
Start: 2024-12-02 | End: 2024-12-03 | Stop reason: SDUPTHER

## 2024-12-03 ENCOUNTER — OFFICE VISIT (OUTPATIENT)
Dept: INTERNAL MEDICINE CLINIC | Age: 64
End: 2024-12-03

## 2024-12-03 VITALS
BODY MASS INDEX: 30.99 KG/M2 | HEART RATE: 58 BPM | OXYGEN SATURATION: 97 % | WEIGHT: 168.38 LBS | SYSTOLIC BLOOD PRESSURE: 136 MMHG | RESPIRATION RATE: 16 BRPM | HEIGHT: 62 IN | DIASTOLIC BLOOD PRESSURE: 78 MMHG

## 2024-12-03 DIAGNOSIS — R06.2 WHEEZING: ICD-10-CM

## 2024-12-03 DIAGNOSIS — R73.9 HYPERGLYCEMIA: ICD-10-CM

## 2024-12-03 DIAGNOSIS — E78.2 MIXED HYPERLIPIDEMIA: ICD-10-CM

## 2024-12-03 DIAGNOSIS — I10 ESSENTIAL HYPERTENSION: Primary | ICD-10-CM

## 2024-12-03 DIAGNOSIS — M81.0 AGE-RELATED OSTEOPOROSIS WITHOUT CURRENT PATHOLOGICAL FRACTURE: ICD-10-CM

## 2024-12-03 DIAGNOSIS — E28.2 PCOS (POLYCYSTIC OVARIAN SYNDROME): ICD-10-CM

## 2024-12-03 DIAGNOSIS — J30.89 NON-SEASONAL ALLERGIC RHINITIS DUE TO OTHER ALLERGIC TRIGGER: ICD-10-CM

## 2024-12-03 DIAGNOSIS — E03.4 HYPOTHYROIDISM DUE TO ACQUIRED ATROPHY OF THYROID: ICD-10-CM

## 2024-12-03 RX ORDER — ALENDRONATE SODIUM 70 MG/1
70 TABLET ORAL
Qty: 13 TABLET | Refills: 1 | Status: SHIPPED | OUTPATIENT
Start: 2024-12-03

## 2024-12-03 RX ORDER — MONTELUKAST SODIUM 10 MG/1
10 TABLET ORAL DAILY
Qty: 90 TABLET | Refills: 1 | Status: SHIPPED | OUTPATIENT
Start: 2024-12-03

## 2024-12-03 RX ORDER — AZELASTINE HYDROCHLORIDE 137 UG/1
1 SPRAY, METERED NASAL 2 TIMES DAILY PRN
Qty: 90 ML | Refills: 1 | Status: CANCELLED | OUTPATIENT
Start: 2024-12-03

## 2024-12-03 RX ORDER — LEVOTHYROXINE SODIUM 88 UG/1
88 TABLET ORAL DAILY
Qty: 90 TABLET | Refills: 1 | Status: SHIPPED | OUTPATIENT
Start: 2024-12-03

## 2024-12-03 RX ORDER — HYDRALAZINE HYDROCHLORIDE 25 MG/1
25 TABLET, FILM COATED ORAL 2 TIMES DAILY
Qty: 180 TABLET | Refills: 1 | Status: SHIPPED | OUTPATIENT
Start: 2024-12-03

## 2024-12-03 RX ORDER — FLUTICASONE PROPIONATE 50 MCG
2 SPRAY, SUSPENSION (ML) NASAL DAILY
Qty: 48 G | Refills: 0 | Status: CANCELLED | OUTPATIENT
Start: 2024-12-03

## 2024-12-03 RX ORDER — ROSUVASTATIN CALCIUM 5 MG/1
5 TABLET, COATED ORAL NIGHTLY
Qty: 90 TABLET | Refills: 1 | Status: SHIPPED | OUTPATIENT
Start: 2024-12-03

## 2024-12-03 RX ORDER — SPIRONOLACTONE 100 MG/1
100 TABLET, FILM COATED ORAL 2 TIMES DAILY
Qty: 180 TABLET | Refills: 0 | Status: SHIPPED | OUTPATIENT
Start: 2024-12-03

## 2024-12-03 NOTE — PROGRESS NOTES
Natalya Mcdonnell Bryanna  1960 12/03/24    SUBJECTIVE:   Prior L sided headache and blurred vision resolved, bx TA was benign.    She wonders if may have had an allergic rxn.  We'll monitor for now.    Hypertension: Stable. Denies CP, SOB, cough, visual changes, dizziness, palpitations or HA.         Hypothyroid has been asymptomatic w/o sx of fatigue, constipation, cold intolerance, depression.    Asthma:  Patient denies significant chest pain/tightness, dyspnea, decreased exercise tolerance, cough, or wheezing on current regimen.    Obesity, is motivated to try to lose another 20#.  -- rec to try ashley counting for 2499-2604/day**    Hx pcos, on metformin.    OBJECTIVE:    /78   Pulse 58   Resp 16   Ht 1.575 m (5' 2\")   Wt 76.4 kg (168 lb 6 oz)   SpO2 97%   BMI 30.80 kg/m²     Physical Exam  Constitutional:       General: She is not in acute distress.     Appearance: She is well-developed. She is not diaphoretic.   HENT:      Head: Normocephalic and atraumatic.   Neck:      Thyroid: No thyromegaly.      Trachea: No tracheal deviation.   Cardiovascular:      Rate and Rhythm: Normal rate and regular rhythm.      Heart sounds: Normal heart sounds. No murmur heard.     No friction rub. No gallop.   Pulmonary:      Effort: No respiratory distress.      Breath sounds: Normal breath sounds. No wheezing or rales.   Abdominal:      General: Bowel sounds are normal. There is no distension.      Palpations: Abdomen is soft. There is no mass.      Tenderness: There is no abdominal tenderness.      Hernia: No hernia is present.   Musculoskeletal:      Right lower leg: No edema.      Left lower leg: No edema.   Lymphadenopathy:      Cervical: No cervical adenopathy.         ASSESSMENT:    1. Essential hypertension    2. Hypothyroidism due to acquired atrophy of thyroid    3. Mixed hyperlipidemia    4. PCOS (polycystic ovarian syndrome)    5. Non-seasonal allergic rhinitis due to other allergic trigger    6. Wheezing

## 2024-12-03 NOTE — PATIENT INSTRUCTIONS
To lose wt ,rec to try the phone AP MYFITNESSPAL OR THE AP LOSE IT, keeping intake to (FEMALES~1200-1500cal/day)  (MALES 7443-6129/DAY)  Rec 20-30min exercise/day.

## 2024-12-23 DIAGNOSIS — J30.89 NON-SEASONAL ALLERGIC RHINITIS DUE TO OTHER ALLERGIC TRIGGER: ICD-10-CM

## 2024-12-23 RX ORDER — AZELASTINE HYDROCHLORIDE 137 UG/1
SPRAY, METERED NASAL
Qty: 60 ML | Refills: 2 | OUTPATIENT
Start: 2024-12-23

## 2025-01-17 ENCOUNTER — TELEPHONE (OUTPATIENT)
Dept: INTERNAL MEDICINE CLINIC | Age: 65
End: 2025-01-17

## 2025-01-17 DIAGNOSIS — Z12.11 COLON CANCER SCREENING: Primary | ICD-10-CM

## 2025-01-17 NOTE — TELEPHONE ENCOUNTER
Patient called stating she is due for her C-scope and she was wondering if she could get a referral to

## 2025-05-05 DIAGNOSIS — E28.2 PCOS (POLYCYSTIC OVARIAN SYNDROME): ICD-10-CM

## 2025-05-05 RX ORDER — SPIRONOLACTONE 100 MG/1
100 TABLET, FILM COATED ORAL 2 TIMES DAILY
Qty: 180 TABLET | Refills: 3 | OUTPATIENT
Start: 2025-05-05

## 2025-05-22 LAB
A/G RATIO: 1.7 RATIO (ref 0.8–2.6)
ALBUMIN: 4.1 G/DL (ref 3.5–5.2)
ALP BLD-CCNC: 74 U/L (ref 23–144)
ALT SERPL-CCNC: 22 U/L (ref 0–60)
AST SERPL-CCNC: 19 U/L (ref 0–55)
BASOPHILS ABSOLUTE: 0.1 K/UL (ref 0–0.3)
BASOPHILS RELATIVE PERCENT: 1.2 % (ref 0–2)
BILIRUB SERPL-MCNC: 0.3 MG/DL (ref 0–1.2)
BUN / CREAT RATIO: 14 (ref 7–25)
BUN BLDV-MCNC: 13 MG/DL (ref 3–29)
CALCIUM SERPL-MCNC: 10 MG/DL (ref 8.5–10.5)
CHLORIDE BLD-SCNC: 104 MEQ/L (ref 96–110)
CHOLESTEROL, TOTAL: 211 MG/DL
CO2: 27 MEQ/L (ref 19–32)
CREAT SERPL-MCNC: 0.9 MG/DL (ref 0.5–1.2)
DIFFERENTIAL COUNT: NORMAL
EOSINOPHILS ABSOLUTE: 0.2 K/UL (ref 0–0.5)
EOSINOPHILS RELATIVE PERCENT: 2.7 % (ref 0–5)
ESTIMATED GLOMERULAR FILTRATION RATE CREATININE EQUATION: 70 MLS/MIN/1.73M2
FASTING STATUS: NORMAL
GLOBULIN: 2.4 G/DL (ref 1.9–3.6)
GLUCOSE BLD-MCNC: 98 MG/DL (ref 70–99)
HBA1C MFR BLD: 5.6 %
HCT VFR BLD CALC: 44.7 % (ref 34–49)
HDLC SERPL-MCNC: 73 MG/DL
HEMOGLOBIN: 14 G/DL (ref 11.2–15.7)
IMMATURE GRANS (ABS): 0 K/UL (ref 0–0.1)
IMMATURE GRANULOCYTES %: 0.4 %
LDL CHOLESTEROL: 119 MG/DL
LYMPHOCYTES ABSOLUTE: 2.8 K/UL (ref 0.9–4.1)
LYMPHOCYTES RELATIVE PERCENT: 32.9 % (ref 14–51)
MCH RBC QN AUTO: 29.6 PG (ref 26–34)
MCHC RBC AUTO-ENTMCNC: 31.3 G/DL (ref 30.7–35.5)
MCV RBC AUTO: 94.5 FL (ref 80–100)
MONOCYTES ABSOLUTE: 0.6 K/UL (ref 0.2–1)
MONOCYTES RELATIVE PERCENT: 7 % (ref 4–12)
NEUTROPHILS ABSOLUTE: 4.8 K/UL (ref 1.8–7.5)
NEUTROPHILS RELATIVE PERCENT: 55.8 % (ref 42–80)
PDW BLD-RTO: 12.2 %
PLATELET # BLD: 369 K/UL (ref 140–400)
PMV BLD AUTO: 10.7 FL (ref 7.2–11.7)
POTASSIUM SERPL-SCNC: 4.6 MEQ/L (ref 3.4–5.3)
RBC # BLD: 4.73 M/UL (ref 3.95–5.26)
RETICULOCYTE ABSOLUTE COUNT: 0 /100 WBC
SODIUM BLD-SCNC: 143 MEQ/L (ref 135–148)
T4 FREE: 1.47 NG/DL (ref 0.8–1.8)
TOTAL PROTEIN: 6.5 G/DL (ref 6–8.3)
TRIGL SERPL-MCNC: 97 MG/DL
TSH ULTRASENSITIVE: 2.37 MCIU/ML (ref 0.4–4.5)
VLDLC SERPL CALC-MCNC: 19 MG/DL (ref 4–38)
WBC # BLD: 8.6 K/UL (ref 3.5–10.9)

## 2025-05-26 ENCOUNTER — RESULTS FOLLOW-UP (OUTPATIENT)
Dept: INTERNAL MEDICINE CLINIC | Age: 65
End: 2025-05-26

## 2025-05-29 SDOH — ECONOMIC STABILITY: FOOD INSECURITY: WITHIN THE PAST 12 MONTHS, YOU WORRIED THAT YOUR FOOD WOULD RUN OUT BEFORE YOU GOT MONEY TO BUY MORE.: NEVER TRUE

## 2025-05-29 SDOH — ECONOMIC STABILITY: FOOD INSECURITY: WITHIN THE PAST 12 MONTHS, THE FOOD YOU BOUGHT JUST DIDN'T LAST AND YOU DIDN'T HAVE MONEY TO GET MORE.: NEVER TRUE

## 2025-05-29 SDOH — ECONOMIC STABILITY: TRANSPORTATION INSECURITY
IN THE PAST 12 MONTHS, HAS THE LACK OF TRANSPORTATION KEPT YOU FROM MEDICAL APPOINTMENTS OR FROM GETTING MEDICATIONS?: NO

## 2025-05-29 SDOH — HEALTH STABILITY: PHYSICAL HEALTH: ON AVERAGE, HOW MANY DAYS PER WEEK DO YOU ENGAGE IN MODERATE TO STRENUOUS EXERCISE (LIKE A BRISK WALK)?: 3 DAYS

## 2025-05-29 SDOH — ECONOMIC STABILITY: INCOME INSECURITY: IN THE LAST 12 MONTHS, WAS THERE A TIME WHEN YOU WERE NOT ABLE TO PAY THE MORTGAGE OR RENT ON TIME?: NO

## 2025-05-29 SDOH — HEALTH STABILITY: PHYSICAL HEALTH: ON AVERAGE, HOW MANY MINUTES DO YOU ENGAGE IN EXERCISE AT THIS LEVEL?: 60 MIN

## 2025-05-29 ASSESSMENT — PATIENT HEALTH QUESTIONNAIRE - PHQ9
SUM OF ALL RESPONSES TO PHQ QUESTIONS 1-9: 0
1. LITTLE INTEREST OR PLEASURE IN DOING THINGS: NOT AT ALL
SUM OF ALL RESPONSES TO PHQ QUESTIONS 1-9: 0
2. FEELING DOWN, DEPRESSED OR HOPELESS: NOT AT ALL

## 2025-05-29 ASSESSMENT — LIFESTYLE VARIABLES
HOW MANY STANDARD DRINKS CONTAINING ALCOHOL DO YOU HAVE ON A TYPICAL DAY: 1 OR 2
HOW MANY STANDARD DRINKS CONTAINING ALCOHOL DO YOU HAVE ON A TYPICAL DAY: 1
HOW OFTEN DO YOU HAVE A DRINK CONTAINING ALCOHOL: MONTHLY OR LESS
HOW OFTEN DO YOU HAVE SIX OR MORE DRINKS ON ONE OCCASION: 1
HOW OFTEN DO YOU HAVE A DRINK CONTAINING ALCOHOL: 2

## 2025-05-29 NOTE — PROGRESS NOTES
Medicare Annual Wellness Visit    Natalya Gould is here for No chief complaint on file.    Awv-- remains independent, functional and active, no indications/needs for other interventions noted at this time- except as noted below and also findings noted on screening medicare questions.    For periodic wheezing and asthma tendency will continue montelukast and rescue inhaler    For osteoporosis Fosamax will be continued and follow-up vitamin D levels    Allergies also stable with nasal inhalers, Singulair    Blood pressure stable on medication, refill hydralazine    Hypothyroid, slightly higher TSH, will recheck this in 2 months and if rising may need to adjust dose    PCOS, is now clearly menopausal but has had benefit maintaining weight with low-carb diet and will also continue metformin, has done an excellent job with continued weight loss especially since [place]    Also will continue monitoring her impaired fasting glucose checking A1c and fasting glucose  Assessment & Plan   Medicare annual wellness visit, subsequent  Wheezing  -     albuterol sulfate HFA (PROVENTIL HFA) 108 (90 Base) MCG/ACT inhaler; Inhale 2 puffs into the lungs every 6 hours as needed for Wheezing, Disp-3 each, R-1Normal  -     montelukast (SINGULAIR) 10 MG tablet; Take 1 tablet by mouth daily, Disp-90 tablet, R-1Normal  -     ESTABLISHED, MOD MDM, 30-39 MIN [97987]  Age-related osteoporosis without current pathological fracture  -     alendronate (FOSAMAX) 70 MG tablet; Take 1 tablet by mouth every 7 days, Disp-13 tablet, R-1Normal  -     Vitamin D 25 Hydroxy; Future  -     ESTABLISHED, MOD MDM, 30-39 MIN [81212]  Non-seasonal allergic rhinitis due to other allergic trigger  -     azelastine (ASTEPRO) 137 MCG/SPRAY nasal spray; 1 spray by Nasal route 2 times daily as needed (ALLERGIES), Disp-90 mL, R-1Normal  -     fluticasone (FLONASE) 50 MCG/ACT nasal spray; 2 sprays by Nasal route daily, Disp-48 g, R-0Normal  -

## 2025-05-30 ENCOUNTER — OFFICE VISIT (OUTPATIENT)
Dept: INTERNAL MEDICINE CLINIC | Age: 65
End: 2025-05-30

## 2025-05-30 VITALS
WEIGHT: 163.8 LBS | OXYGEN SATURATION: 98 % | RESPIRATION RATE: 18 BRPM | HEIGHT: 62 IN | DIASTOLIC BLOOD PRESSURE: 66 MMHG | HEART RATE: 67 BPM | BODY MASS INDEX: 30.14 KG/M2 | SYSTOLIC BLOOD PRESSURE: 124 MMHG

## 2025-05-30 DIAGNOSIS — E03.4 HYPOTHYROIDISM DUE TO ACQUIRED ATROPHY OF THYROID: ICD-10-CM

## 2025-05-30 DIAGNOSIS — I10 ESSENTIAL HYPERTENSION: ICD-10-CM

## 2025-05-30 DIAGNOSIS — M81.0 AGE-RELATED OSTEOPOROSIS WITHOUT CURRENT PATHOLOGICAL FRACTURE: ICD-10-CM

## 2025-05-30 DIAGNOSIS — Z00.00 ROUTINE GENERAL MEDICAL EXAMINATION AT A HEALTH CARE FACILITY: ICD-10-CM

## 2025-05-30 DIAGNOSIS — E28.2 PCOS (POLYCYSTIC OVARIAN SYNDROME): ICD-10-CM

## 2025-05-30 DIAGNOSIS — R06.2 WHEEZING: ICD-10-CM

## 2025-05-30 DIAGNOSIS — Z00.00 MEDICARE ANNUAL WELLNESS VISIT, SUBSEQUENT: Primary | ICD-10-CM

## 2025-05-30 DIAGNOSIS — J30.89 NON-SEASONAL ALLERGIC RHINITIS DUE TO OTHER ALLERGIC TRIGGER: ICD-10-CM

## 2025-05-30 DIAGNOSIS — R73.9 HYPERGLYCEMIA: ICD-10-CM

## 2025-05-30 DIAGNOSIS — E78.2 MIXED HYPERLIPIDEMIA: ICD-10-CM

## 2025-05-30 RX ORDER — AZELASTINE HYDROCHLORIDE 137 UG/1
1 SPRAY, METERED NASAL 2 TIMES DAILY PRN
Qty: 90 ML | Refills: 1 | Status: SHIPPED | OUTPATIENT
Start: 2025-05-30

## 2025-05-30 RX ORDER — HYDRALAZINE HYDROCHLORIDE 25 MG/1
25 TABLET, FILM COATED ORAL 2 TIMES DAILY
Qty: 180 TABLET | Refills: 1 | Status: SHIPPED | OUTPATIENT
Start: 2025-05-30

## 2025-05-30 RX ORDER — ALBUTEROL SULFATE 90 UG/1
2 INHALANT RESPIRATORY (INHALATION) EVERY 6 HOURS PRN
Qty: 3 EACH | Refills: 1 | Status: SHIPPED | OUTPATIENT
Start: 2025-05-30

## 2025-05-30 RX ORDER — SPIRONOLACTONE 100 MG/1
100 TABLET, FILM COATED ORAL 2 TIMES DAILY
Qty: 180 TABLET | Refills: 1 | Status: SHIPPED | OUTPATIENT
Start: 2025-05-30

## 2025-05-30 RX ORDER — ACETAMINOPHEN 500 MG
TABLET ORAL
COMMUNITY
Start: 2024-06-01

## 2025-05-30 RX ORDER — ROSUVASTATIN CALCIUM 5 MG/1
5 TABLET, COATED ORAL
Qty: 36 TABLET | Refills: 1 | Status: SHIPPED | OUTPATIENT
Start: 2025-05-30 | End: 2025-11-26

## 2025-05-30 RX ORDER — FLUTICASONE PROPIONATE 50 MCG
2 SPRAY, SUSPENSION (ML) NASAL DAILY
Qty: 48 G | Refills: 0 | Status: SHIPPED | OUTPATIENT
Start: 2025-05-30

## 2025-05-30 RX ORDER — LEVOTHYROXINE SODIUM 88 UG/1
88 TABLET ORAL DAILY
Qty: 90 TABLET | Refills: 1 | Status: SHIPPED | OUTPATIENT
Start: 2025-05-30

## 2025-05-30 RX ORDER — BIOTIN 1 MG
TABLET ORAL
COMMUNITY

## 2025-05-30 RX ORDER — ALENDRONATE SODIUM 70 MG/1
70 TABLET ORAL
Qty: 13 TABLET | Refills: 1 | Status: SHIPPED | OUTPATIENT
Start: 2025-05-30

## 2025-05-30 RX ORDER — MONTELUKAST SODIUM 10 MG/1
10 TABLET ORAL DAILY
Qty: 90 TABLET | Refills: 1 | Status: SHIPPED | OUTPATIENT
Start: 2025-05-30

## 2025-07-29 LAB
A/G RATIO: 2.1 RATIO (ref 0.8–2.6)
ALBUMIN: 4.4 G/DL (ref 3.5–5.2)
ALP BLD-CCNC: 71 U/L (ref 23–144)
ALT SERPL-CCNC: 21 U/L (ref 0–60)
AST SERPL-CCNC: 18 U/L (ref 0–55)
BASOPHILS ABSOLUTE: 0.1 K/UL (ref 0–0.3)
BASOPHILS RELATIVE PERCENT: 1.1 % (ref 0–2)
BILIRUB SERPL-MCNC: 0.3 MG/DL (ref 0–1.2)
BUN / CREAT RATIO: 10 (ref 7–25)
BUN BLDV-MCNC: 9 MG/DL (ref 3–29)
CALCIUM SERPL-MCNC: 10.1 MG/DL (ref 8.5–10.5)
CHLORIDE BLD-SCNC: 105 MEQ/L (ref 96–110)
CHOLESTEROL, TOTAL: 180 MG/DL
CO2: 26 MEQ/L (ref 19–32)
CREAT SERPL-MCNC: 0.9 MG/DL (ref 0.5–1.2)
DIFFERENTIAL COUNT: NORMAL
EOSINOPHILS ABSOLUTE: 0.3 K/UL (ref 0–0.5)
EOSINOPHILS RELATIVE PERCENT: 3.8 % (ref 0–5)
ESTIMATED GLOMERULAR FILTRATION RATE CREATININE EQUATION: 70 MLS/MIN/1.73M2
FASTING STATUS: NORMAL
GLOBULIN: 2.1 G/DL (ref 1.9–3.6)
GLUCOSE BLD-MCNC: 94 MG/DL (ref 70–99)
HBA1C MFR BLD: 5.5 %
HCT VFR BLD CALC: 42.5 % (ref 34–49)
HDLC SERPL-MCNC: 68 MG/DL
HEMOGLOBIN: 13.5 G/DL (ref 11.2–15.7)
IMMATURE GRANS (ABS): 0 K/UL (ref 0–0.1)
IMMATURE GRANULOCYTES %: 0.2 %
LDL CHOLESTEROL: 85 MG/DL
LYMPHOCYTES ABSOLUTE: 2.6 K/UL (ref 0.9–4.1)
LYMPHOCYTES RELATIVE PERCENT: 32.1 % (ref 14–51)
MCH RBC QN AUTO: 29.9 PG (ref 26–34)
MCHC RBC AUTO-ENTMCNC: 31.8 G/DL (ref 30.7–35.5)
MCV RBC AUTO: 94.2 FL (ref 80–100)
MONOCYTES ABSOLUTE: 0.5 K/UL (ref 0.2–1)
MONOCYTES RELATIVE PERCENT: 6.2 % (ref 4–12)
NEUTROPHILS ABSOLUTE: 4.6 K/UL (ref 1.8–7.5)
NEUTROPHILS RELATIVE PERCENT: 56.6 % (ref 42–80)
PDW BLD-RTO: 11.9 %
PLATELET # BLD: 360 K/UL (ref 140–400)
PMV BLD AUTO: 11.2 FL (ref 7.2–11.7)
POTASSIUM SERPL-SCNC: 4.4 MEQ/L (ref 3.4–5.3)
RBC # BLD: 4.51 M/UL (ref 3.95–5.26)
RETICULOCYTE ABSOLUTE COUNT: 0 /100 WBC
SODIUM BLD-SCNC: 145 MEQ/L (ref 135–148)
T4 FREE: 1.68 NG/DL (ref 0.8–1.8)
TOTAL PROTEIN: 6.5 G/DL (ref 6–8.3)
TRIGL SERPL-MCNC: 137 MG/DL
TSH ULTRASENSITIVE: 1.93 MCIU/ML (ref 0.4–4.5)
VITAMIN D 25-HYDROXY: 38 NG/ML (ref 30–100)
VLDLC SERPL CALC-MCNC: 27 MG/DL (ref 4–38)
WBC # BLD: 8.2 K/UL (ref 3.5–10.9)

## 2025-08-28 DIAGNOSIS — J30.89 NON-SEASONAL ALLERGIC RHINITIS DUE TO OTHER ALLERGIC TRIGGER: ICD-10-CM

## 2025-08-28 RX ORDER — FLUTICASONE PROPIONATE 50 MCG
2 SPRAY, SUSPENSION (ML) NASAL DAILY
Qty: 48 G | Refills: 3 | Status: SHIPPED | OUTPATIENT
Start: 2025-08-28

## (undated) DEVICE — Z DISCONTINUED NO SUB IDED TUBING ETCO2 AD L6.5FT NSL ORAL CVD PRNG NONFLARED TIP OVR